# Patient Record
Sex: FEMALE | Race: WHITE | NOT HISPANIC OR LATINO
[De-identification: names, ages, dates, MRNs, and addresses within clinical notes are randomized per-mention and may not be internally consistent; named-entity substitution may affect disease eponyms.]

---

## 2017-08-17 ENCOUNTER — RESULT REVIEW (OUTPATIENT)
Age: 47
End: 2017-08-17

## 2017-12-16 ENCOUNTER — INPATIENT (INPATIENT)
Facility: HOSPITAL | Age: 47
LOS: 3 days | Discharge: HOME | DRG: 482 | End: 2017-12-20
Attending: ORTHOPAEDIC SURGERY | Admitting: ORTHOPAEDIC SURGERY
Payer: MEDICAID

## 2017-12-16 VITALS
DIASTOLIC BLOOD PRESSURE: 62 MMHG | HEART RATE: 76 BPM | SYSTOLIC BLOOD PRESSURE: 99 MMHG | OXYGEN SATURATION: 99 % | RESPIRATION RATE: 16 BRPM | TEMPERATURE: 97 F

## 2017-12-16 PROCEDURE — 99285 EMERGENCY DEPT VISIT HI MDM: CPT | Mod: 25

## 2017-12-17 DIAGNOSIS — R52 PAIN, UNSPECIFIED: ICD-10-CM

## 2017-12-17 DIAGNOSIS — E87.6 HYPOKALEMIA: ICD-10-CM

## 2017-12-17 DIAGNOSIS — S72.002A FRACTURE OF UNSPECIFIED PART OF NECK OF LEFT FEMUR, INITIAL ENCOUNTER FOR CLOSED FRACTURE: ICD-10-CM

## 2017-12-17 LAB
ALBUMIN SERPL ELPH-MCNC: 4.5 G/DL — SIGNIFICANT CHANGE UP (ref 3.3–5)
ALP SERPL-CCNC: 46 U/L — SIGNIFICANT CHANGE UP (ref 40–120)
ALT FLD-CCNC: 25 U/L RC — SIGNIFICANT CHANGE UP (ref 10–45)
ANION GAP SERPL CALC-SCNC: 12 MMOL/L — SIGNIFICANT CHANGE UP (ref 5–17)
APPEARANCE UR: CLEAR — SIGNIFICANT CHANGE UP
APTT BLD: 31.7 SEC — SIGNIFICANT CHANGE UP (ref 27.5–37.4)
AST SERPL-CCNC: 29 U/L — SIGNIFICANT CHANGE UP (ref 10–40)
BASOPHILS # BLD AUTO: 0 K/UL — SIGNIFICANT CHANGE UP (ref 0–0.2)
BASOPHILS NFR BLD AUTO: 0.3 % — SIGNIFICANT CHANGE UP (ref 0–2)
BILIRUB SERPL-MCNC: 0.4 MG/DL — SIGNIFICANT CHANGE UP (ref 0.2–1.2)
BILIRUB UR-MCNC: NEGATIVE — SIGNIFICANT CHANGE UP
BLD GP AB SCN SERPL QL: NEGATIVE — SIGNIFICANT CHANGE UP
BUN SERPL-MCNC: 13 MG/DL — SIGNIFICANT CHANGE UP (ref 7–23)
CALCIUM SERPL-MCNC: 9.1 MG/DL — SIGNIFICANT CHANGE UP (ref 8.4–10.5)
CHLORIDE SERPL-SCNC: 105 MMOL/L — SIGNIFICANT CHANGE UP (ref 96–108)
CO2 SERPL-SCNC: 23 MMOL/L — SIGNIFICANT CHANGE UP (ref 22–31)
COLOR SPEC: SIGNIFICANT CHANGE UP
CREAT SERPL-MCNC: 0.57 MG/DL — SIGNIFICANT CHANGE UP (ref 0.5–1.3)
DIFF PNL FLD: ABNORMAL
EOSINOPHIL # BLD AUTO: 0 K/UL — SIGNIFICANT CHANGE UP (ref 0–0.5)
EOSINOPHIL NFR BLD AUTO: 0.2 % — SIGNIFICANT CHANGE UP (ref 0–6)
GLUCOSE SERPL-MCNC: 132 MG/DL — HIGH (ref 70–99)
GLUCOSE UR QL: NEGATIVE — SIGNIFICANT CHANGE UP
HCG SERPL-ACNC: <2 MIU/ML — LOW (ref 5–24)
HCT VFR BLD CALC: 37.9 % — SIGNIFICANT CHANGE UP (ref 34.5–45)
HGB BLD-MCNC: 12.9 G/DL — SIGNIFICANT CHANGE UP (ref 11.5–15.5)
INR BLD: 1.09 RATIO — SIGNIFICANT CHANGE UP (ref 0.88–1.16)
KETONES UR-MCNC: NEGATIVE — SIGNIFICANT CHANGE UP
LEUKOCYTE ESTERASE UR-ACNC: NEGATIVE — SIGNIFICANT CHANGE UP
LYMPHOCYTES # BLD AUTO: 1 K/UL — SIGNIFICANT CHANGE UP (ref 1–3.3)
LYMPHOCYTES # BLD AUTO: 11.3 % — LOW (ref 13–44)
MCHC RBC-ENTMCNC: 33.5 PG — SIGNIFICANT CHANGE UP (ref 27–34)
MCHC RBC-ENTMCNC: 34.2 GM/DL — SIGNIFICANT CHANGE UP (ref 32–36)
MCV RBC AUTO: 97.9 FL — SIGNIFICANT CHANGE UP (ref 80–100)
MONOCYTES # BLD AUTO: 0.5 K/UL — SIGNIFICANT CHANGE UP (ref 0–0.9)
MONOCYTES NFR BLD AUTO: 5.5 % — SIGNIFICANT CHANGE UP (ref 2–14)
NEUTROPHILS # BLD AUTO: 7.3 K/UL — SIGNIFICANT CHANGE UP (ref 1.8–7.4)
NEUTROPHILS NFR BLD AUTO: 82.7 % — HIGH (ref 43–77)
NITRITE UR-MCNC: NEGATIVE — SIGNIFICANT CHANGE UP
PH UR: 7 — SIGNIFICANT CHANGE UP (ref 5–8)
PLATELET # BLD AUTO: 191 K/UL — SIGNIFICANT CHANGE UP (ref 150–400)
POTASSIUM SERPL-MCNC: 3.4 MMOL/L — LOW (ref 3.5–5.3)
POTASSIUM SERPL-SCNC: 3.4 MMOL/L — LOW (ref 3.5–5.3)
PROT SERPL-MCNC: 7.2 G/DL — SIGNIFICANT CHANGE UP (ref 6–8.3)
PROT UR-MCNC: NEGATIVE — SIGNIFICANT CHANGE UP
PROTHROM AB SERPL-ACNC: 11.9 SEC — SIGNIFICANT CHANGE UP (ref 9.8–12.7)
RBC # BLD: 3.87 M/UL — SIGNIFICANT CHANGE UP (ref 3.8–5.2)
RBC # FLD: 11.3 % — SIGNIFICANT CHANGE UP (ref 10.3–14.5)
RH IG SCN BLD-IMP: POSITIVE — SIGNIFICANT CHANGE UP
RH IG SCN BLD-IMP: POSITIVE — SIGNIFICANT CHANGE UP
SODIUM SERPL-SCNC: 140 MMOL/L — SIGNIFICANT CHANGE UP (ref 135–145)
SP GR SPEC: 1.01 — SIGNIFICANT CHANGE UP (ref 1.01–1.02)
UROBILINOGEN FLD QL: NEGATIVE — SIGNIFICANT CHANGE UP
WBC # BLD: 8.8 K/UL — SIGNIFICANT CHANGE UP (ref 3.8–10.5)
WBC # FLD AUTO: 8.8 K/UL — SIGNIFICANT CHANGE UP (ref 3.8–10.5)

## 2017-12-17 PROCEDURE — 73502 X-RAY EXAM HIP UNI 2-3 VIEWS: CPT | Mod: 26,LT

## 2017-12-17 PROCEDURE — 72192 CT PELVIS W/O DYE: CPT | Mod: 26

## 2017-12-17 PROCEDURE — 73502 X-RAY EXAM HIP UNI 2-3 VIEWS: CPT | Mod: 26,77,LT

## 2017-12-17 PROCEDURE — 73552 X-RAY EXAM OF FEMUR 2/>: CPT | Mod: 26,LT

## 2017-12-17 PROCEDURE — 71010: CPT | Mod: 26

## 2017-12-17 PROCEDURE — 76377 3D RENDER W/INTRP POSTPROCES: CPT | Mod: 26

## 2017-12-17 RX ORDER — ASCORBIC ACID 60 MG
500 TABLET,CHEWABLE ORAL
Qty: 0 | Refills: 0 | Status: DISCONTINUED | OUTPATIENT
Start: 2017-12-17 | End: 2017-12-20

## 2017-12-17 RX ORDER — SODIUM CHLORIDE 9 MG/ML
1000 INJECTION INTRAMUSCULAR; INTRAVENOUS; SUBCUTANEOUS
Qty: 0 | Refills: 0 | Status: DISCONTINUED | OUTPATIENT
Start: 2017-12-17 | End: 2017-12-17

## 2017-12-17 RX ORDER — ACETAMINOPHEN 500 MG
1000 TABLET ORAL ONCE
Qty: 0 | Refills: 0 | Status: COMPLETED | OUTPATIENT
Start: 2017-12-17 | End: 2017-12-17

## 2017-12-17 RX ORDER — ACETAMINOPHEN 500 MG
325 TABLET ORAL EVERY 4 HOURS
Qty: 0 | Refills: 0 | Status: DISCONTINUED | OUTPATIENT
Start: 2017-12-17 | End: 2017-12-17

## 2017-12-17 RX ORDER — OXYCODONE HYDROCHLORIDE 5 MG/1
5 TABLET ORAL EVERY 4 HOURS
Qty: 0 | Refills: 0 | Status: DISCONTINUED | OUTPATIENT
Start: 2017-12-17 | End: 2017-12-20

## 2017-12-17 RX ORDER — ACETAMINOPHEN 500 MG
975 TABLET ORAL ONCE
Qty: 0 | Refills: 0 | Status: DISCONTINUED | OUTPATIENT
Start: 2017-12-17 | End: 2017-12-17

## 2017-12-17 RX ORDER — ONDANSETRON 8 MG/1
4 TABLET, FILM COATED ORAL ONCE
Qty: 0 | Refills: 0 | Status: DISCONTINUED | OUTPATIENT
Start: 2017-12-17 | End: 2017-12-17

## 2017-12-17 RX ORDER — OXYCODONE HYDROCHLORIDE 5 MG/1
5 TABLET ORAL EVERY 4 HOURS
Qty: 0 | Refills: 0 | Status: DISCONTINUED | OUTPATIENT
Start: 2017-12-17 | End: 2017-12-17

## 2017-12-17 RX ORDER — HYDROMORPHONE HYDROCHLORIDE 2 MG/ML
1 INJECTION INTRAMUSCULAR; INTRAVENOUS; SUBCUTANEOUS EVERY 4 HOURS
Qty: 0 | Refills: 0 | Status: DISCONTINUED | OUTPATIENT
Start: 2017-12-17 | End: 2017-12-18

## 2017-12-17 RX ORDER — ALPRAZOLAM 0.25 MG
1 TABLET ORAL ONCE
Qty: 0 | Refills: 0 | Status: DISCONTINUED | OUTPATIENT
Start: 2017-12-17 | End: 2017-12-17

## 2017-12-17 RX ORDER — ONDANSETRON 8 MG/1
4 TABLET, FILM COATED ORAL EVERY 6 HOURS
Qty: 0 | Refills: 0 | Status: DISCONTINUED | OUTPATIENT
Start: 2017-12-17 | End: 2017-12-20

## 2017-12-17 RX ORDER — HYDROMORPHONE HYDROCHLORIDE 2 MG/ML
1 INJECTION INTRAMUSCULAR; INTRAVENOUS; SUBCUTANEOUS EVERY 4 HOURS
Qty: 0 | Refills: 0 | Status: DISCONTINUED | OUTPATIENT
Start: 2017-12-17 | End: 2017-12-17

## 2017-12-17 RX ORDER — SODIUM CHLORIDE 9 MG/ML
1000 INJECTION, SOLUTION INTRAVENOUS ONCE
Qty: 0 | Refills: 0 | Status: COMPLETED | OUTPATIENT
Start: 2017-12-17 | End: 2017-12-17

## 2017-12-17 RX ORDER — MORPHINE SULFATE 50 MG/1
4 CAPSULE, EXTENDED RELEASE ORAL ONCE
Qty: 0 | Refills: 0 | Status: DISCONTINUED | OUTPATIENT
Start: 2017-12-17 | End: 2017-12-17

## 2017-12-17 RX ORDER — KETOROLAC TROMETHAMINE 30 MG/ML
30 SYRINGE (ML) INJECTION ONCE
Qty: 0 | Refills: 0 | Status: DISCONTINUED | OUTPATIENT
Start: 2017-12-17 | End: 2017-12-17

## 2017-12-17 RX ORDER — FERROUS SULFATE 325(65) MG
325 TABLET ORAL
Qty: 0 | Refills: 0 | Status: DISCONTINUED | OUTPATIENT
Start: 2017-12-17 | End: 2017-12-20

## 2017-12-17 RX ORDER — DOCUSATE SODIUM 100 MG
100 CAPSULE ORAL THREE TIMES A DAY
Qty: 0 | Refills: 0 | Status: DISCONTINUED | OUTPATIENT
Start: 2017-12-17 | End: 2017-12-20

## 2017-12-17 RX ORDER — ASPIRIN/CALCIUM CARB/MAGNESIUM 325 MG
325 TABLET ORAL
Qty: 0 | Refills: 0 | Status: DISCONTINUED | OUTPATIENT
Start: 2017-12-18 | End: 2017-12-18

## 2017-12-17 RX ORDER — ACETAMINOPHEN 500 MG
650 TABLET ORAL EVERY 6 HOURS
Qty: 0 | Refills: 0 | Status: DISCONTINUED | OUTPATIENT
Start: 2017-12-17 | End: 2017-12-20

## 2017-12-17 RX ORDER — OXYCODONE HYDROCHLORIDE 5 MG/1
10 TABLET ORAL EVERY 4 HOURS
Qty: 0 | Refills: 0 | Status: DISCONTINUED | OUTPATIENT
Start: 2017-12-17 | End: 2017-12-17

## 2017-12-17 RX ORDER — SODIUM CHLORIDE 9 MG/ML
1000 INJECTION INTRAMUSCULAR; INTRAVENOUS; SUBCUTANEOUS
Qty: 0 | Refills: 0 | Status: DISCONTINUED | OUTPATIENT
Start: 2017-12-17 | End: 2017-12-20

## 2017-12-17 RX ORDER — FOLIC ACID 0.8 MG
1 TABLET ORAL DAILY
Qty: 0 | Refills: 0 | Status: DISCONTINUED | OUTPATIENT
Start: 2017-12-17 | End: 2017-12-20

## 2017-12-17 RX ORDER — HYDROMORPHONE HYDROCHLORIDE 2 MG/ML
0.5 INJECTION INTRAMUSCULAR; INTRAVENOUS; SUBCUTANEOUS
Qty: 0 | Refills: 0 | Status: DISCONTINUED | OUTPATIENT
Start: 2017-12-17 | End: 2017-12-17

## 2017-12-17 RX ORDER — ACETAMINOPHEN 500 MG
650 TABLET ORAL EVERY 6 HOURS
Qty: 0 | Refills: 0 | Status: DISCONTINUED | OUTPATIENT
Start: 2017-12-17 | End: 2017-12-17

## 2017-12-17 RX ORDER — VANCOMYCIN HCL 1 G
1000 VIAL (EA) INTRAVENOUS ONCE
Qty: 0 | Refills: 0 | Status: COMPLETED | OUTPATIENT
Start: 2017-12-18 | End: 2017-12-18

## 2017-12-17 RX ORDER — OXYCODONE HYDROCHLORIDE 5 MG/1
10 TABLET ORAL EVERY 4 HOURS
Qty: 0 | Refills: 0 | Status: DISCONTINUED | OUTPATIENT
Start: 2017-12-17 | End: 2017-12-20

## 2017-12-17 RX ADMIN — Medication 400 MILLIGRAM(S): at 10:20

## 2017-12-17 RX ADMIN — HYDROMORPHONE HYDROCHLORIDE 0.5 MILLIGRAM(S): 2 INJECTION INTRAMUSCULAR; INTRAVENOUS; SUBCUTANEOUS at 20:20

## 2017-12-17 RX ADMIN — SODIUM CHLORIDE 100 MILLILITER(S): 9 INJECTION INTRAMUSCULAR; INTRAVENOUS; SUBCUTANEOUS at 06:32

## 2017-12-17 RX ADMIN — HYDROMORPHONE HYDROCHLORIDE 0.5 MILLIGRAM(S): 2 INJECTION INTRAMUSCULAR; INTRAVENOUS; SUBCUTANEOUS at 20:35

## 2017-12-17 RX ADMIN — SODIUM CHLORIDE 75 MILLILITER(S): 9 INJECTION INTRAMUSCULAR; INTRAVENOUS; SUBCUTANEOUS at 21:52

## 2017-12-17 RX ADMIN — HYDROMORPHONE HYDROCHLORIDE 0.5 MILLIGRAM(S): 2 INJECTION INTRAMUSCULAR; INTRAVENOUS; SUBCUTANEOUS at 20:45

## 2017-12-17 RX ADMIN — SODIUM CHLORIDE 75 MILLILITER(S): 9 INJECTION INTRAMUSCULAR; INTRAVENOUS; SUBCUTANEOUS at 20:45

## 2017-12-17 RX ADMIN — HYDROMORPHONE HYDROCHLORIDE 0.5 MILLIGRAM(S): 2 INJECTION INTRAMUSCULAR; INTRAVENOUS; SUBCUTANEOUS at 21:00

## 2017-12-17 RX ADMIN — Medication 30 MILLIGRAM(S): at 00:31

## 2017-12-17 RX ADMIN — ONDANSETRON 4 MILLIGRAM(S): 8 TABLET, FILM COATED ORAL at 21:52

## 2017-12-17 RX ADMIN — Medication 1000 MILLIGRAM(S): at 02:00

## 2017-12-17 RX ADMIN — Medication 400 MILLIGRAM(S): at 01:51

## 2017-12-17 RX ADMIN — OXYCODONE HYDROCHLORIDE 5 MILLIGRAM(S): 5 TABLET ORAL at 11:13

## 2017-12-17 RX ADMIN — OXYCODONE HYDROCHLORIDE 5 MILLIGRAM(S): 5 TABLET ORAL at 12:21

## 2017-12-17 RX ADMIN — Medication 30 MILLIGRAM(S): at 01:30

## 2017-12-17 RX ADMIN — SODIUM CHLORIDE 500 MILLILITER(S): 9 INJECTION, SOLUTION INTRAVENOUS at 22:30

## 2017-12-17 NOTE — CHART NOTE - NSCHARTNOTEFT_GEN_A_CORE
No acute events post-op. Pain well controlled with pain medications. C/o some dizziness, nausea.      Vital Signs Last 24 Hrs  T(C): 36.3 (17 Dec 2017 21:45), Max: 37.1 (17 Dec 2017 16:34)  T(F): 97.4 (17 Dec 2017 21:45), Max: 98.7 (17 Dec 2017 16:34)  HR: 67 (17 Dec 2017 21:45) (60 - 82)  BP: 96/65 (17 Dec 2017 21:45) (96/65 - 129/77)  BP(mean): 77 (17 Dec 2017 21:15) (77 - 85)  RR: 16 (17 Dec 2017 21:45) (14 - 16)  SpO2: 98% (17 Dec 2017 21:45) (97% - 100%)    Exam:  Gen: NAD  LLE:  Motor: 5/5 EHL/FHL/TA/Gastrocnemius  Sensory: SILT DP/SP/S/S/T nerve distributions  Vascular: 2+ Dorsalis Pedis pulse  Dressing: Clean, Dry, Intact    A/P: 47 year old F s/p CRPP L FN Fx    - Pain Control  - Regular Diet  - PT/OT, OOB, TTWB LLE  - ASA DVT PPx

## 2017-12-17 NOTE — ED PROVIDER NOTE - OBJECTIVE STATEMENT
47F with no past medical history presents with L hip pain after fall onto L hip while walking down ramp.  No head trauma, loc, n/v, headache.  Had pain with walking so bibems.  Able to move leg, LE warm to touch.  Denies prior injury to hip, abdominal pain, chest pain, shortness of breath. 47F with no past medical history presents with L hip pain after fall onto L hip while walking down ramp.  No head trauma, loc, n/v, headache.  Had pain with walking so bibems.  Able to move leg, LE warm to touch.  Denies prior injury to hip, abdominal pain, chest pain, shortness of breath.    Attendinyo female presents with left hip pain s/p fall on a ramp just prior to arrival.  Pt unable to ambulate.

## 2017-12-17 NOTE — ED ADULT NURSE NOTE - OBJECTIVE STATEMENT
Pt. is a 48 yo female c/o L hip pain s/p going down a ramp & landing on her L hip. No loc, no head trauma. A&Ox3.

## 2017-12-17 NOTE — H&P ADULT - ASSESSMENT
A/P: 47y Female with L valgus impacted femoral neck fracture and R inf pubic ramus fracture  Pain control  NWB LLE, bedrest  FU labs/imaging  Medical clearance/optimization for OR  NPO  IVF  OR today for CRPP

## 2017-12-17 NOTE — BRIEF OPERATIVE NOTE - POST-OP DX
Femoral neck fracture  12/17/2017  left valgus-impacted femoral neck fracture  Active  Samir Valentine

## 2017-12-17 NOTE — CONSULT NOTE ADULT - SUBJECTIVE AND OBJECTIVE BOX
Patient is a 48 yo womanwho states she slipped and felll last night in her garage. Patient was brought to [facility] by ambulance. In ED pt was found to have a left hip fx.Pt is scheduled for an ORIF of the left hip  Patient is a 47y old  Female who presents with a chief complaint of mechanical fall (17 Dec 2017 06:49)      PAST MEDICAL & SURGICAL HISTORY:  C section x 2      MEDICATIONS  (STANDING):  sodium chloride 0.9%. 1000 milliLiter(s) (100 mL/Hr) IV Continuous <Continuous>    MEDICATIONS  (PRN):  acetaminophen   Tablet 650 milliGRAM(s) Oral every 6 hours PRN For Temp greater than 38 C (100.4 F)  acetaminophen   Tablet. 325 milliGRAM(s) Oral every 4 hours PRN Mild Pain (1 - 3)  HYDROmorphone  Injectable 1 milliGRAM(s) IV Push every 4 hours PRN breakthrough pain  oxyCODONE    IR 10 milliGRAM(s) Oral every 4 hours PRN Severe Pain (7 - 10)  oxyCODONE    IR 5 milliGRAM(s) Oral every 4 hours PRN Moderate Pain (4 - 6)    SOC HX:  Tobacco: Neg  ETOH: Neg  Drugs: Neg    Family Hx:  as per my conversation with the patient, noncontributory    allergies: PCN    CONSTITUTIONAL: No weakness, fevers or chills  EYES/ENT: No visual changes;  No vertigo or throat pain   NECK: No pain or stiffness  RESPIRATORY: No cough, wheezing, hemoptysis; No shortness of breath  CARDIOVASCULAR: No chest pain or palpitations  GASTROINTESTINAL: No abdominal or epigastric pain. No nausea, vomiting, or hematemesis; No diarrhea or constipation. No melena or hematochezia.  GENITOURINARY: No dysuria, frequency or hematuria  NEUROLOGICAL: No numbness or weakness  SKIN: No itching, burning, rashes, or lesions   MUSCULOSKELETAL: Left leg pain    INTERVAL HPI/OVERNIGHT EVENTS:  T(C): 36.9 (12-17-17 @ 06:26), Max: 36.9 (12-17-17 @ 06:26)  HR: 70 (12-17-17 @ 06:26) (70 - 78)  BP: 113/72 (12-17-17 @ 06:26) (99/62 - 116/69)  RR: 16 (12-17-17 @ 06:26) (16 - 16)  SpO2: 100% (12-17-17 @ 06:26) (97% - 100%)  Wt(kg): --  I&O's Summary      PHYSICAL EXAM:  GENERAL: NAD, well-groomed, well-developed  HEAD:  Atraumatic, Normocephalic  EYES: EOMI, PERRLA, conjunctiva and sclera clear  ENMT: No tonsillar erythema, exudates, or enlargement; Moist mucous membranes, Good dentition, No lesions  NECK: Supple, No JVD, Normal thyroid  NERVOUS SYSTEM:  Alert & Oriented X3, Good concentration; Motor Strength 5/5 B/L upper and lower extremities; DTRs 2+ intact and symmetric  CHEST/LUNG: Clear to percussion bilaterally; No rales, rhonchi, wheezing, or rubs  HEART: Regular rate and rhythm; No murmurs, rubs, or gallops  ABDOMEN: Soft, Nontender, Nondistended; Bowel sounds present  EXTREMITIES: shortening and external rotation of left leg  LYMPH: No lymphadenopathy noted  SKIN: No rashes or lesions        LABS:                        12.9   8.8   )-----------( 191      ( 17 Dec 2017 01:50 )             37.9     12-17    140  |  105  |  13  ----------------------------<  132<H>  3.4<L>   |  23  |  0.57    Ca    9.1      17 Dec 2017 01:50    TPro  7.2  /  Alb  4.5  /  TBili  0.4  /  DBili  x   /  AST  29  /  ALT  25  /  AlkPhos  46  12-17    PT/INR - ( 17 Dec 2017 01:50 )   PT: 11.9 sec;   INR: 1.09 ratio         PTT - ( 17 Dec 2017 01:50 )  PTT:31.7 sec    EKG:  NSR @ 77 BPM

## 2017-12-17 NOTE — H&P ADULT - NSHPPHYSICALEXAM_GEN_ALL_CORE
Physical Exam  Gen: NAD  LLE: skin intact, short/ER leg, unable to SLR, + log roll, +ttp hip/groin, no ttp elsewhere, +ehl/fhl/ta/gs function, no calf ttp, dp/pt pulse intact, compartments soft  Secondary survey: benign, nv intact, able to SLR contralateral leg but with pain, negative log roll contralateral leg, bony ttp right pubic symphsis and groin, no bony ttp elsewhere, bilateral upper extremity skin intact with no gross deformity, non tender to palpation over bony prominences, neurovascularly intact

## 2017-12-18 LAB
ANION GAP SERPL CALC-SCNC: 12 MMOL/L — SIGNIFICANT CHANGE UP (ref 5–17)
BUN SERPL-MCNC: 9 MG/DL — SIGNIFICANT CHANGE UP (ref 7–23)
CALCIUM SERPL-MCNC: 7.8 MG/DL — LOW (ref 8.4–10.5)
CHLORIDE SERPL-SCNC: 107 MMOL/L — SIGNIFICANT CHANGE UP (ref 96–108)
CO2 SERPL-SCNC: 20 MMOL/L — LOW (ref 22–31)
CREAT SERPL-MCNC: 0.57 MG/DL — SIGNIFICANT CHANGE UP (ref 0.5–1.3)
CULTURE RESULTS: NO GROWTH — SIGNIFICANT CHANGE UP
GLUCOSE SERPL-MCNC: 145 MG/DL — HIGH (ref 70–99)
HCT VFR BLD CALC: 33.9 % — LOW (ref 34.5–45)
HGB BLD-MCNC: 10.9 G/DL — LOW (ref 11.5–15.5)
MCHC RBC-ENTMCNC: 30.7 PG — SIGNIFICANT CHANGE UP (ref 27–34)
MCHC RBC-ENTMCNC: 32.2 GM/DL — SIGNIFICANT CHANGE UP (ref 32–36)
MCV RBC AUTO: 95.5 FL — SIGNIFICANT CHANGE UP (ref 80–100)
PLATELET # BLD AUTO: 150 K/UL — SIGNIFICANT CHANGE UP (ref 150–400)
POTASSIUM SERPL-MCNC: 3.9 MMOL/L — SIGNIFICANT CHANGE UP (ref 3.5–5.3)
POTASSIUM SERPL-SCNC: 3.9 MMOL/L — SIGNIFICANT CHANGE UP (ref 3.5–5.3)
RBC # BLD: 3.55 M/UL — LOW (ref 3.8–5.2)
RBC # FLD: 12.5 % — SIGNIFICANT CHANGE UP (ref 10.3–14.5)
SODIUM SERPL-SCNC: 139 MMOL/L — SIGNIFICANT CHANGE UP (ref 135–145)
SPECIMEN SOURCE: SIGNIFICANT CHANGE UP
WBC # BLD: 5.79 K/UL — SIGNIFICANT CHANGE UP (ref 3.8–10.5)
WBC # FLD AUTO: 5.79 K/UL — SIGNIFICANT CHANGE UP (ref 3.8–10.5)

## 2017-12-18 RX ORDER — HYDROMORPHONE HYDROCHLORIDE 2 MG/ML
1 INJECTION INTRAMUSCULAR; INTRAVENOUS; SUBCUTANEOUS
Qty: 0 | Refills: 0 | Status: DISCONTINUED | OUTPATIENT
Start: 2017-12-18 | End: 2017-12-20

## 2017-12-18 RX ORDER — ASPIRIN/CALCIUM CARB/MAGNESIUM 324 MG
325 TABLET ORAL EVERY 12 HOURS
Qty: 0 | Refills: 0 | Status: DISCONTINUED | OUTPATIENT
Start: 2017-12-18 | End: 2017-12-18

## 2017-12-18 RX ORDER — ASPIRIN/CALCIUM CARB/MAGNESIUM 324 MG
325 TABLET ORAL
Qty: 0 | Refills: 0 | Status: DISCONTINUED | OUTPATIENT
Start: 2017-12-18 | End: 2017-12-20

## 2017-12-18 RX ORDER — ASPIRIN/CALCIUM CARB/MAGNESIUM 324 MG
325 TABLET ORAL DAILY
Qty: 0 | Refills: 0 | Status: DISCONTINUED | OUTPATIENT
Start: 2017-12-18 | End: 2017-12-18

## 2017-12-18 RX ORDER — ACETAMINOPHEN 500 MG
1000 TABLET ORAL ONCE
Qty: 0 | Refills: 0 | Status: COMPLETED | OUTPATIENT
Start: 2017-12-18 | End: 2017-12-18

## 2017-12-18 RX ADMIN — Medication 400 MILLIGRAM(S): at 10:06

## 2017-12-18 RX ADMIN — OXYCODONE HYDROCHLORIDE 5 MILLIGRAM(S): 5 TABLET ORAL at 23:50

## 2017-12-18 RX ADMIN — Medication 100 MILLIGRAM(S): at 12:43

## 2017-12-18 RX ADMIN — Medication 325 MILLIGRAM(S): at 17:28

## 2017-12-18 RX ADMIN — Medication 325 MILLIGRAM(S): at 07:51

## 2017-12-18 RX ADMIN — OXYCODONE HYDROCHLORIDE 5 MILLIGRAM(S): 5 TABLET ORAL at 04:10

## 2017-12-18 RX ADMIN — Medication 1 MILLIGRAM(S): at 12:43

## 2017-12-18 RX ADMIN — OXYCODONE HYDROCHLORIDE 5 MILLIGRAM(S): 5 TABLET ORAL at 04:40

## 2017-12-18 RX ADMIN — OXYCODONE HYDROCHLORIDE 5 MILLIGRAM(S): 5 TABLET ORAL at 00:34

## 2017-12-18 RX ADMIN — Medication 1 TABLET(S): at 12:43

## 2017-12-18 RX ADMIN — Medication 325 MILLIGRAM(S): at 12:43

## 2017-12-18 RX ADMIN — Medication 500 MILLIGRAM(S): at 17:28

## 2017-12-18 RX ADMIN — Medication 100 MILLIGRAM(S): at 05:49

## 2017-12-18 RX ADMIN — OXYCODONE HYDROCHLORIDE 5 MILLIGRAM(S): 5 TABLET ORAL at 00:04

## 2017-12-18 RX ADMIN — Medication 500 MILLIGRAM(S): at 05:49

## 2017-12-18 RX ADMIN — Medication 325 MILLIGRAM(S): at 06:54

## 2017-12-18 RX ADMIN — OXYCODONE HYDROCHLORIDE 5 MILLIGRAM(S): 5 TABLET ORAL at 14:20

## 2017-12-18 RX ADMIN — Medication 250 MILLIGRAM(S): at 05:49

## 2017-12-18 NOTE — PROGRESS NOTE ADULT - ASSESSMENT
47F with right pubic ramus fracture, s/p left hip CRPP POD# 1    TTWB left LE  WBAT right LE  No active heel lifts  No hip ROM exercises  Pain control  DVT prophylaxis  PT  Discharge planning

## 2017-12-18 NOTE — PROGRESS NOTE ADULT - SUBJECTIVE AND OBJECTIVE BOX
Patient is a 46 yo woman who states she slipped and fell last night in her garage. Patient was brought to [facility] by ambulance. In ED pt was found to have a left hip fx. Pt is scheduled for an ORIF of the left hip    MEDICATIONS  (STANDING):  ascorbic acid 500 milliGRAM(s) Oral two times a day  aspirin enteric coated 325 milliGRAM(s) Oral two times a day  docusate sodium 100 milliGRAM(s) Oral three times a day  ferrous    sulfate 325 milliGRAM(s) Oral three times a day with meals  folic acid 1 milliGRAM(s) Oral daily  multivitamin 1 Tablet(s) Oral daily  sodium chloride 0.9%. 1000 milliLiter(s) (75 mL/Hr) IV Continuous <Continuous>    MEDICATIONS  (PRN):  acetaminophen   Tablet 650 milliGRAM(s) Oral every 6 hours PRN For Temp greater than 38 C (100.4 F)  HYDROmorphone  Injectable 1 milliGRAM(s) IV Push every 3 hours PRN Severe Pain (7 - 10)  ondansetron Injectable 4 milliGRAM(s) IV Push every 6 hours PRN Nausea and/or Vomiting  oxyCODONE    IR 10 milliGRAM(s) Oral every 4 hours PRN Moderate Pain  oxyCODONE    IR 5 milliGRAM(s) Oral every 4 hours PRN Mild Pain      CONSTITUTIONAL: No weakness, fevers or chills  EYES/ENT: No visual changes;  No vertigo or throat pain   NECK: No pain or stiffness  RESPIRATORY: No cough, wheezing, hemoptysis; No shortness of breath  CARDIOVASCULAR: No chest pain or palpitations  GASTROINTESTINAL: No abdominal or epigastric pain. No nausea, vomiting, or hematemesis; No diarrhea or constipation. No melena or hematochezia.  GENITOURINARY: No dysuria, frequency or hematuria  NEUROLOGICAL: No numbness or weakness  SKIN: No itching, burning, rashes, or lesions   MUSCULOSKELETAL: Left leg pain    VITALS:   T(C): 36.7 (17 @ 09:30), Max: 37.1 (17 @ 16:34)  HR: 91 (17 @ 09:30) (60 - 91)  BP: 147/88 (17 @ 09:30) (93/59 - 147/88)  RR: 18 (17 @ 09:30) (14 - 18)  SpO2: 99% (17 @ 09:30) (95% - 100%)  Wt(kg): --        LABS:      PHYSICAL EXAM:  GENERAL: NAD, well-groomed, well-developed  HEAD:  Atraumatic, Normocephalic  EYES: EOMI, PERRLA, conjunctiva and sclera clear  ENMT: No tonsillar erythema, exudates, or enlargement; Moist mucous membranes, Good dentition, No lesions  NECK: Supple, No JVD, Normal thyroid  NERVOUS SYSTEM:  Alert & Oriented X3, Good concentration; Motor Strength 5/5 B/L upper and lower extremities; DTRs 2+ intact and symmetric  CHEST/LUNG: Clear to percussion bilaterally; No rales, rhonchi, wheezing, or rubs  HEART: Regular rate and rhythm; No murmurs, rubs, or gallops  ABDOMEN: Soft, Nontender, Nondistended; Bowel sounds present  EXTREMITIES: shortening and external rotation of left leg  LYMPH: No lymphadenopathy noted  SKIN: No rashes or lesions        CBC Full  -  ( 18 Dec 2017 08:39 )  WBC Count : 5.79 K/uL  Hemoglobin : 10.9 g/dL  Hematocrit : 33.9 %  Platelet Count - Automated : 150 K/uL  Mean Cell Volume : 95.5 fl  Mean Cell Hemoglobin : 30.7 pg  Mean Cell Hemoglobin Concentration : 32.2 gm/dL  Auto Neutrophil # : x  Auto Lymphocyte # : x  Auto Monocyte # : x  Auto Eosinophil # : x  Auto Basophil # : x  Auto Neutrophil % : x  Auto Lymphocyte % : x  Auto Monocyte % : x  Auto Eosinophil % : x  Auto Basophil % : x        140  |  105  |  13  ----------------------------<  132<H>  3.4<L>   |  23  |  0.57    Ca    9.1      17 Dec 2017 01:50    TPro  7.2  /  Alb  4.5  /  TBili  0.4  /  DBili  x   /  AST  29  /  ALT  25  /  AlkPhos  46  12-    LIVER FUNCTIONS - ( 17 Dec 2017 01:50 )  Alb: 4.5 g/dL / Pro: 7.2 g/dL / ALK PHOS: 46 U/L / ALT: 25 U/L RC / AST: 29 U/L / GGT: x           PT/INR - ( 17 Dec 2017 01:50 )   PT: 11.9 sec;   INR: 1.09 ratio         PTT - ( 17 Dec 2017 01:50 )  PTT:31.7 sec  Urinalysis Basic - ( 17 Dec 2017 09:31 )    Color: PL Yellow / Appearance: Clear / S.010 / pH: x  Gluc: x / Ketone: Negative  / Bili: Negative / Urobili: Negative   Blood: x / Protein: Negative / Nitrite: Negative   Leuk Esterase: Negative / RBC: 0-2 /HPF / WBC x   Sq Epi: x / Non Sq Epi: Occasional /HPF / Bacteria: Few /HPF      CAPILLARY BLOOD GLUCOSE          RADIOLOGY & ADDITIONAL TESTS:

## 2017-12-18 NOTE — PHYSICAL THERAPY INITIAL EVALUATION ADULT - PERTINENT HX OF CURRENT PROBLEM, REHAB EVAL
Pt is a 47y Female community ambulator w/o assistive devices presents c/o L hip pain and inability to ambulate s/p mechanical fall 47y Female community ambulator w/o assistive devices presents c/o L hip pain & inability to ambulate sp mechanical fall. CT: Comminuted, impacted left subcapital femoral fracture. s/p Surgical treatment of fracture of hip.

## 2017-12-18 NOTE — OCCUPATIONAL THERAPY INITIAL EVALUATION ADULT - PERTINENT HX OF CURRENT PROBLEM, REHAB EVAL
47y Female community ambulator w/o assistive devices presents c/o L hip pain & inability to ambulate sp mechanical fall. Denies HS/LOC. CT: Comminuted, impacted left subcapital femoral fracture. s/p Surgical treatment of fracture of hip  12/17/2017  closed reduction and percutaneous pinning of left valgus-impacted femoral neck fracture

## 2017-12-18 NOTE — OCCUPATIONAL THERAPY INITIAL EVALUATION ADULT - ANTICIPATED DISCHARGE DISPOSITION, OT EVAL
home w/ OT/Home with home OT for home safety evaluation, functional mobility, balance and ADLs. Home with supervision/assist for all functional activities and ADLs.

## 2017-12-19 ENCOUNTER — TRANSCRIPTION ENCOUNTER (OUTPATIENT)
Age: 47
End: 2017-12-19

## 2017-12-19 LAB
ANION GAP SERPL CALC-SCNC: 9 MMOL/L — SIGNIFICANT CHANGE UP (ref 5–17)
BUN SERPL-MCNC: 11 MG/DL — SIGNIFICANT CHANGE UP (ref 7–23)
CALCIUM SERPL-MCNC: 8.1 MG/DL — LOW (ref 8.4–10.5)
CHLORIDE SERPL-SCNC: 110 MMOL/L — HIGH (ref 96–108)
CO2 SERPL-SCNC: 23 MMOL/L — SIGNIFICANT CHANGE UP (ref 22–31)
CREAT SERPL-MCNC: 0.47 MG/DL — LOW (ref 0.5–1.3)
GLUCOSE SERPL-MCNC: 101 MG/DL — HIGH (ref 70–99)
HCT VFR BLD CALC: 33 % — LOW (ref 34.5–45)
HGB BLD-MCNC: 10.2 G/DL — LOW (ref 11.5–15.5)
MCHC RBC-ENTMCNC: 30.6 PG — SIGNIFICANT CHANGE UP (ref 27–34)
MCHC RBC-ENTMCNC: 30.9 GM/DL — LOW (ref 32–36)
MCV RBC AUTO: 99.1 FL — SIGNIFICANT CHANGE UP (ref 80–100)
PLATELET # BLD AUTO: 147 K/UL — LOW (ref 150–400)
POTASSIUM SERPL-MCNC: 3.5 MMOL/L — SIGNIFICANT CHANGE UP (ref 3.5–5.3)
POTASSIUM SERPL-SCNC: 3.5 MMOL/L — SIGNIFICANT CHANGE UP (ref 3.5–5.3)
RBC # BLD: 3.33 M/UL — LOW (ref 3.8–5.2)
RBC # FLD: 13.2 % — SIGNIFICANT CHANGE UP (ref 10.3–14.5)
SODIUM SERPL-SCNC: 142 MMOL/L — SIGNIFICANT CHANGE UP (ref 135–145)
WBC # BLD: 4.44 K/UL — SIGNIFICANT CHANGE UP (ref 3.8–10.5)
WBC # FLD AUTO: 4.44 K/UL — SIGNIFICANT CHANGE UP (ref 3.8–10.5)

## 2017-12-19 RX ORDER — DOCUSATE SODIUM 100 MG
1 CAPSULE ORAL
Qty: 0 | Refills: 0 | COMMUNITY
Start: 2017-12-19

## 2017-12-19 RX ORDER — ASPIRIN/CALCIUM CARB/MAGNESIUM 324 MG
1 TABLET ORAL
Qty: 0 | Refills: 0 | COMMUNITY
Start: 2017-12-19

## 2017-12-19 RX ORDER — ACETAMINOPHEN 500 MG
650 TABLET ORAL EVERY 6 HOURS
Qty: 0 | Refills: 0 | Status: DISCONTINUED | OUTPATIENT
Start: 2017-12-19 | End: 2017-12-20

## 2017-12-19 RX ORDER — SENNA PLUS 8.6 MG/1
2 TABLET ORAL AT BEDTIME
Qty: 0 | Refills: 0 | Status: DISCONTINUED | OUTPATIENT
Start: 2017-12-19 | End: 2017-12-20

## 2017-12-19 RX ORDER — ACETAMINOPHEN 500 MG
2 TABLET ORAL
Qty: 0 | Refills: 0 | COMMUNITY
Start: 2017-12-19

## 2017-12-19 RX ORDER — SENNA PLUS 8.6 MG/1
2 TABLET ORAL
Qty: 0 | Refills: 0 | COMMUNITY
Start: 2017-12-19

## 2017-12-19 RX ADMIN — Medication 325 MILLIGRAM(S): at 17:35

## 2017-12-19 RX ADMIN — Medication 500 MILLIGRAM(S): at 17:35

## 2017-12-19 RX ADMIN — Medication 325 MILLIGRAM(S): at 05:50

## 2017-12-19 RX ADMIN — Medication 1 MILLIGRAM(S): at 12:44

## 2017-12-19 RX ADMIN — Medication 325 MILLIGRAM(S): at 09:40

## 2017-12-19 RX ADMIN — Medication 325 MILLIGRAM(S): at 12:44

## 2017-12-19 RX ADMIN — Medication 100 MILLIGRAM(S): at 12:45

## 2017-12-19 RX ADMIN — Medication 650 MILLIGRAM(S): at 09:40

## 2017-12-19 RX ADMIN — OXYCODONE HYDROCHLORIDE 5 MILLIGRAM(S): 5 TABLET ORAL at 00:20

## 2017-12-19 RX ADMIN — Medication 500 MILLIGRAM(S): at 05:51

## 2017-12-19 RX ADMIN — Medication 100 MILLIGRAM(S): at 05:50

## 2017-12-19 RX ADMIN — Medication 100 MILLIGRAM(S): at 22:00

## 2017-12-19 RX ADMIN — Medication 1 TABLET(S): at 12:44

## 2017-12-19 NOTE — DISCHARGE NOTE ADULT - PATIENT PORTAL LINK FT
“You can access the [facility] Patient Portal, offered by [facility], by registering with the following website: [email or web address]”

## 2017-12-19 NOTE — PROGRESS NOTE ADULT - SUBJECTIVE AND OBJECTIVE BOX
Patient is a 46 yo woman who states she slipped and fell last night in her garage. Patient was brought to [facility] by ambulance. In ED pt was found to have a left hip fx. Pt is scheduled for an ORIF of the left hip    MEDICATIONS  (STANDING):  ascorbic acid 500 milliGRAM(s) Oral two times a day  aspirin enteric coated 325 milliGRAM(s) Oral two times a day  docusate sodium 100 milliGRAM(s) Oral three times a day  ferrous    sulfate 325 milliGRAM(s) Oral three times a day with meals  folic acid 1 milliGRAM(s) Oral daily  multivitamin 1 Tablet(s) Oral daily  senna 2 Tablet(s) Oral at bedtime  sodium chloride 0.9%. 1000 milliLiter(s) (75 mL/Hr) IV Continuous <Continuous>    MEDICATIONS  (PRN):  acetaminophen   Tablet 650 milliGRAM(s) Oral every 6 hours PRN For Temp greater than 38 C (100.4 F)  acetaminophen   Tablet. 650 milliGRAM(s) Oral every 6 hours PRN Moderate Pain (4 - 6)  HYDROmorphone  Injectable 1 milliGRAM(s) IV Push every 3 hours PRN Severe Pain (7 - 10)  ondansetron Injectable 4 milliGRAM(s) IV Push every 6 hours PRN Nausea and/or Vomiting  oxyCODONE    IR 10 milliGRAM(s) Oral every 4 hours PRN Moderate Pain  oxyCODONE    IR 5 milliGRAM(s) Oral every 4 hours PRN Mild Pain      CONSTITUTIONAL: No weakness, fevers or chills  EYES/ENT: No visual changes;  No vertigo or throat pain   NECK: No pain or stiffness  RESPIRATORY: No cough, wheezing, hemoptysis; No shortness of breath  CARDIOVASCULAR: No chest pain or palpitations  GASTROINTESTINAL: No abdominal or epigastric pain. No nausea, vomiting, or hematemesis; No diarrhea or constipation. No melena or hematochezia.  GENITOURINARY: No dysuria, frequency or hematuria  NEUROLOGICAL: No numbness or weakness  SKIN: No itching, burning, rashes, or lesions   MUSCULOSKELETAL: Left leg pain    Vital Signs Last 24 Hrs  T(C): 36.7 (19 Dec 2017 07:35), Max: 36.9 (18 Dec 2017 14:13)  T(F): 98.1 (19 Dec 2017 07:35), Max: 98.4 (18 Dec 2017 14:13)  HR: 72 (19 Dec 2017 12:41) (70 - 80)  BP: 109/68 (19 Dec 2017 12:41) (100/64 - 109/68)  BP(mean): --  RR: 18 (19 Dec 2017 07:35) (18 - 18)  SpO2: 99% (19 Dec 2017 12:41) (97% - 100%)  LABS:      PHYSICAL EXAM:  GENERAL: NAD, well-groomed, well-developed  HEAD:  Atraumatic, Normocephalic  EYES: EOMI, PERRLA, conjunctiva and sclera clear  ENMT: No tonsillar erythema, exudates, or enlargement; Moist mucous membranes, Good dentition, No lesions  NECK: Supple, No JVD, Normal thyroid  NERVOUS SYSTEM:  Alert & Oriented X3, Good concentration; Motor Strength 5/5 B/L upper and lower extremities; DTRs 2+ intact and symmetric  CHEST/LUNG: Clear to percussion bilaterally; No rales, rhonchi, wheezing, or rubs  HEART: Regular rate and rhythm; No murmurs, rubs, or gallops  ABDOMEN: Soft, Nontender, Nondistended; Bowel sounds present  EXTREMITIES: shortening and external rotation of left leg  LYMPH: No lymphadenopathy noted  SKIN: No rashes or lesions          CBC Full  -  ( 19 Dec 2017 08:57 )  WBC Count : 4.44 K/uL  Hemoglobin : 10.2 g/dL  Hematocrit : 33.0 %  Platelet Count - Automated : 147 K/uL  Mean Cell Volume : 99.1 fl  Mean Cell Hemoglobin : 30.6 pg  Mean Cell Hemoglobin Concentration : 30.9 gm/dL  Auto Neutrophil # : x  Auto Lymphocyte # : x  Auto Monocyte # : x  Auto Eosinophil # : x  Auto Basophil # : x  Auto Neutrophil % : x  Auto Lymphocyte % : x  Auto Monocyte % : x  Auto Eosinophil % : x  Auto Basophil % : x    12-19    142  |  110<H>  |  11  ----------------------------<  101<H>  3.5   |  23  |  0.47<L>    Ca    8.1<L>      19 Dec 2017 08:51                    CAPILLARY BLOOD GLUCOSE          RADIOLOGY & ADDITIONAL TESTS: Patient is a 48 yo woman who states she slipped and fell last night in her garage. Patient was brought to [facility] by ambulance. In ED pt was found to have a left hip fx. Pt is S/P  ORIF of the left hip and complaining of pain and difficulty walking. The patient will be going home when more ambulatory                                                          	    MEDICATIONS  (STANDING):  ascorbic acid 500 milliGRAM(s) Oral two times a day  aspirin enteric coated 325 milliGRAM(s) Oral two times a day  docusate sodium 100 milliGRAM(s) Oral three times a day  ferrous    sulfate 325 milliGRAM(s) Oral three times a day with meals  folic acid 1 milliGRAM(s) Oral daily  multivitamin 1 Tablet(s) Oral daily  senna 2 Tablet(s) Oral at bedtime  sodium chloride 0.9%. 1000 milliLiter(s) (75 mL/Hr) IV Continuous <Continuous>    MEDICATIONS  (PRN):  acetaminophen   Tablet 650 milliGRAM(s) Oral every 6 hours PRN For Temp greater than 38 C (100.4 F)  acetaminophen   Tablet. 650 milliGRAM(s) Oral every 6 hours PRN Moderate Pain (4 - 6)  HYDROmorphone  Injectable 1 milliGRAM(s) IV Push every 3 hours PRN Severe Pain (7 - 10)  ondansetron Injectable 4 milliGRAM(s) IV Push every 6 hours PRN Nausea and/or Vomiting  oxyCODONE    IR 10 milliGRAM(s) Oral every 4 hours PRN Moderate Pain  oxyCODONE    IR 5 milliGRAM(s) Oral every 4 hours PRN Mild Pain      CONSTITUTIONAL: No weakness, fevers or chills  EYES/ENT: No visual changes;  No vertigo or throat pain   NECK: No pain or stiffness  RESPIRATORY: No cough, wheezing, hemoptysis; No shortness of breath  CARDIOVASCULAR: No chest pain or palpitations  GASTROINTESTINAL: No abdominal or epigastric pain. No nausea, vomiting, or hematemesis; No diarrhea or constipation. No melena or hematochezia.  GENITOURINARY: No dysuria, frequency or hematuria  NEUROLOGICAL: No numbness or weakness  SKIN: No itching, burning, rashes, or lesions   MUSCULOSKELETAL: Left leg pain    Vital Signs Last 24 Hrs  T(C): 36.7 (19 Dec 2017 07:35), Max: 36.9 (18 Dec 2017 14:13)  T(F): 98.1 (19 Dec 2017 07:35), Max: 98.4 (18 Dec 2017 14:13)  HR: 72 (19 Dec 2017 12:41) (70 - 80)  BP: 109/68 (19 Dec 2017 12:41) (100/64 - 109/68)  BP(mean): --  RR: 18 (19 Dec 2017 07:35) (18 - 18)  SpO2: 99% (19 Dec 2017 12:41) (97% - 100%)  LABS:      PHYSICAL EXAM:  GENERAL: NAD, well-groomed, well-developed  HEAD:  Atraumatic, Normocephalic  EYES: EOMI, PERRLA, conjunctiva and sclera clear  ENMT: No tonsillar erythema, exudates, or enlargement; Moist mucous membranes, Good dentition, No lesions  NECK: Supple, No JVD, Normal thyroid  NERVOUS SYSTEM:  Alert & Oriented X3, Good concentration; Motor Strength 5/5 B/L upper and lower extremities; DTRs 2+ intact and symmetric  CHEST/LUNG: Clear to percussion bilaterally; No rales, rhonchi, wheezing, or rubs  HEART: Regular rate and rhythm; No murmurs, rubs, or gallops  ABDOMEN: Soft, Nontender, Nondistended; Bowel sounds present  EXTREMITIES: shortening and external rotation of left leg  LYMPH: No lymphadenopathy noted  SKIN: No rashes or lesions          CBC Full  -  ( 19 Dec 2017 08:57 )  WBC Count : 4.44 K/uL  Hemoglobin : 10.2 g/dL  Hematocrit : 33.0 %  Platelet Count - Automated : 147 K/uL  Mean Cell Volume : 99.1 fl  Mean Cell Hemoglobin : 30.6 pg  Mean Cell Hemoglobin Concentration : 30.9 gm/dL  Auto Neutrophil # : x  Auto Lymphocyte # : x  Auto Monocyte # : x  Auto Eosinophil # : x  Auto Basophil # : x  Auto Neutrophil % : x  Auto Lymphocyte % : x  Auto Monocyte % : x  Auto Eosinophil % : x  Auto Basophil % : x    12-19    142  |  110<H>  |  11  ----------------------------<  101<H>  3.5   |  23  |  0.47<L>    Ca    8.1<L>      19 Dec 2017 08:51                    CAPILLARY BLOOD GLUCOSE          RADIOLOGY & ADDITIONAL TESTS:

## 2017-12-19 NOTE — DISCHARGE NOTE ADULT - CARE PLAN
Principal Discharge DX:	Closed fracture of neck of left femur, initial encounter  Goal:	Painless ambulation. Return to normal activities.  Instructions for follow-up, activity and diet:	Follow up with Dr. Funk in 10-14 days. Keep dressing dry. Dressing change/staple removal to be done in office on follow up appointment. You may Bear Weight on Right Lower Extremity and Toe Touch Weight Bear on Left Lower Extremity.   Diet as above. Principal Discharge DX:	Closed fracture of neck of left femur, initial encounter  Goal:	Painless ambulation. Return to normal activities.  Instructions for follow-up, activity and diet:	Follow up with Dr. Funk in 10-14 days.   Keep dressing dry.   Dressing change/staple removal to be done in office on follow up appointment.   You may Bear Weight on Right Lower Extremity and Toe Touch Weight Bear on Left Lower Extremity.   Diet as above. Principal Discharge DX:	Closed fracture of neck of left femur, initial encounter  Goal:	Painless ambulation. Return to normal activities.  Instructions for follow-up, activity and diet:	Follow up with Dr. Funk in 10-14 days.   Keep dressing dry.  change dressing EVERY 2 days IF NECESSARY until DRY   You may Bear Weight on Right Lower Extremity and Toe Touch Weight Bear on Left Lower Extremity.     NO active heel lifts or hip range of motion  Diet as above. Principal Discharge DX:	Closed fracture of neck of left femur, initial encounter  Goal:	Painless ambulation. Return to normal activities.  Instructions for follow-up, activity and diet:	Follow up with Dr. Funk in 10-14 days.   Keep dressing dry.  change dressing EVERY 2 days IF NECESSARY until DRY   You may Bear Weight on Right Lower Extremity and Toe Touch Weight Bear on Left Lower Extremity.     NO active heel lifts or hip range of motion  Diet as above.  Instructions for follow-up, activity and diet:	F/U with MD orders  Instructions for follow-up, activity and diet:	F/U with MD orders

## 2017-12-19 NOTE — PROGRESS NOTE ADULT - ASSESSMENT
46 yo woman s/p fall with left hip fx s/p ORIF of left hip 46 yo woman s/p fall with left hip fx s/p ORIF of left hip. She states she slipped and fell last night in her garage. Patient was brought to [facility] by ambulance. In ED pt was found to have a left hip fx. Pt is S/P  ORIF of the left hip and complaining of pain and difficulty walking. The patient will be going home when more ambulatory

## 2017-12-19 NOTE — DISCHARGE NOTE ADULT - CARE PROVIDER_API CALL
Mikey Funk (MD), Orthopaedic Surgery  [address] [place] [id number]  Phone: [phone]  Fax: [phone]

## 2017-12-19 NOTE — DISCHARGE NOTE ADULT - HOME CARE AGENCY
[facility] [phone]  Visiting nurse will call & arrange home care visit for day after discharge.

## 2017-12-19 NOTE — DISCHARGE NOTE ADULT - REASON FOR ADMISSION
mechanical fall  S/P closed reduction and percutaneous pinning of left valgus-impacted femoral neck fracture

## 2017-12-19 NOTE — DISCHARGE NOTE ADULT - MEDICATION SUMMARY - MEDICATIONS TO TAKE
I will START or STAY ON the medications listed below when I get home from the hospital:    acetaminophen 325 mg oral tablet  -- 2 tab(s) by mouth every 6 hours, As needed, For Temp greater than 38 C (100.4 F)  -- Indication: For fever  / pain    aspirin 325 mg oral delayed release tablet  -- 1 tab(s) by mouth 2 times a day x 6 WEEKS Total (blood thinner) then STOP   -- Indication: For blood thinner    oxyCODONE 5 mg oral tablet  -- 1-2 tab(s) by mouth every 6 hours, As Needed MDD:8  -- Indication: For Pain    docusate sodium 100 mg oral capsule  -- 1 cap(s) by mouth 3 times a day   Purchase over-the-counter Colace 100mg and continue to take three times-a-day to prevent constipation while on pain meds.    -- Indication: For stool softener    senna oral tablet  -- 2 tab(s) by mouth once a day (at bedtime)  while on pain medications   -- Indication: For laxative    Multiple Vitamins oral tablet  -- 1 tab(s) by mouth once a day  -- Indication: For supplement    calcium-vitamin D 500 mg-200 intl units oral tablet  -- 1 tab(s) by mouth every 12 hours  -- Indication: For supplement

## 2017-12-19 NOTE — PROGRESS NOTE ADULT - ASSESSMENT
47F with right pubic ramus fracture, s/p left hip CRPP POD# 2    TTWB left LE  WBAT right LE  No active heel lifts  No hip ROM exercises  Pain control  DVT prophylaxis  PT  Ortho stable for discharge

## 2017-12-19 NOTE — DISCHARGE NOTE ADULT - PLAN OF CARE
Painless ambulation. Return to normal activities. Follow up with Dr. Funk in 10-14 days. Keep dressing dry. Dressing change/staple removal to be done in office on follow up appointment. You may Bear Weight on Right Lower Extremity and Toe Touch Weight Bear on Left Lower Extremity.   Diet as above. Follow up with Dr. Funk in 10-14 days.   Keep dressing dry.   Dressing change/staple removal to be done in office on follow up appointment.   You may Bear Weight on Right Lower Extremity and Toe Touch Weight Bear on Left Lower Extremity.   Diet as above. Follow up with Dr. Funk in 10-14 days.   Keep dressing dry.  change dressing EVERY 2 days IF NECESSARY until DRY   You may Bear Weight on Right Lower Extremity and Toe Touch Weight Bear on Left Lower Extremity.     NO active heel lifts or hip range of motion  Diet as above. F/U with MD orders

## 2017-12-19 NOTE — DISCHARGE NOTE ADULT - DURABLE MEDICAL EQUIPMENT AGENCY
[facility] [phone] delivered rolling walker, shower bench & wheelchair to the hospital & will deliver raised toilet seat with arms to your home

## 2017-12-19 NOTE — DISCHARGE NOTE ADULT - NS AS ACTIVITY OBS
Walking-Outdoors allowed/No Heavy lifting/straining/Showering allowed/Walking-Indoors allowed/Do not make important decisions/Do not drive or operate machinery/Stairs allowed

## 2017-12-19 NOTE — PROGRESS NOTE ADULT - SUBJECTIVE AND OBJECTIVE BOX
Patient seen and examined. Pain controlled.    Physical exam  VS: Afebrile, vital signs stable  Gen: NAD  Left LE: Dressing clean, dry, and intact. +EHL/FHL/TA/GSC. SILT L3-S1. +Dorsalis pedis, capillary refill brisk. Compartments soft and compressible.

## 2017-12-19 NOTE — DISCHARGE NOTE ADULT - HOSPITAL COURSE
Admit: 12/17/17  Diagnosis: Left Valgus impacted Femoral Neck Fracture  Procedure: closed reduction and percutaneous pinning of left valgus-impacted femoral neck fracture  Surgeon: Dr. Funk Admit: 12/17/17  Diagnosis: Left Valgus impacted Femoral Neck Fracture  Procedure: closed reduction and percutaneous pinning of left valgus-impacted femoral neck fracture  Surgeon: Dr. Funk      History of Present Illness:  Chief Complaint/Reason for Admission: mechanical fall  History of Present Illness:   47y Female community ambulator w/o assistive devices presents c/o L hip pain and inability to ambulate sp mechanical fall. Denies HS/LOC. Denies numbness/tingling. Denies fever/chills. Denies pain/injury elsewhere.     PAST MEDICAL & SURGICAL HISTORY:  C Section X2    Hospital Course:  12/17/17: Patient brought in by ambulance after slipping and falling in her garage. In the emergency room the patient was found to have a left hip fx. Patient was evaluated by medicine and cleared for surgery. Patient underwent   closed reduction and percutaneous pinning of left valgus-impacted femoral neck fracture. Patient tolerated the procedure well, no complications. Patient was transferred from Recovery Room to [address] [place] [address].  12/18/17: Patient was evaluated by Physical Therapy and recommended Home for discharge disposition. Admit: 12/17/17  Diagnosis: Left Valgus impacted Femoral Neck Fracture  Procedure: closed reduction and percutaneous pinning of left valgus-impacted femoral neck fracture  Surgeon: Dr. Funk      History of Present Illness:  Chief Complaint/Reason for Admission: mechanical fall  History of Present Illness:   47y Female community ambulator w/o assistive devices presents c/o L hip pain and inability to ambulate sp mechanical fall. Denies HS/LOC. Denies numbness/tingling. Denies fever/chills. Denies pain/injury elsewhere.     PAST MEDICAL & SURGICAL HISTORY:  C Section X2    Hospital Course:  12/17/17: Patient brought in by ambulance after slipping and falling in her garage. In the emergency room the patient was found to have a left hip fx. Patient was evaluated by medicine and cleared for surgery. Patient underwent   closed reduction and percutaneous pinning of left valgus-impacted femoral neck fracture. Patient tolerated the procedure well, no complications. Patient was transferred from Recovery Room to [address] [place] [address].  12/18/17: Patient was evaluated by Physical Therapy and recommended Home for discharge disposition.  12/20:  Pt stable for d/c home    Follow up Dr Funk in office

## 2017-12-20 VITALS
OXYGEN SATURATION: 95 % | RESPIRATION RATE: 18 BRPM | SYSTOLIC BLOOD PRESSURE: 109 MMHG | HEART RATE: 69 BPM | TEMPERATURE: 98 F | DIASTOLIC BLOOD PRESSURE: 69 MMHG

## 2017-12-20 PROCEDURE — 72192 CT PELVIS W/O DYE: CPT

## 2017-12-20 PROCEDURE — 97530 THERAPEUTIC ACTIVITIES: CPT

## 2017-12-20 PROCEDURE — 73502 X-RAY EXAM HIP UNI 2-3 VIEWS: CPT

## 2017-12-20 PROCEDURE — 87086 URINE CULTURE/COLONY COUNT: CPT

## 2017-12-20 PROCEDURE — 97116 GAIT TRAINING THERAPY: CPT

## 2017-12-20 PROCEDURE — C1713: CPT

## 2017-12-20 PROCEDURE — 85730 THROMBOPLASTIN TIME PARTIAL: CPT

## 2017-12-20 PROCEDURE — 96372 THER/PROPH/DIAG INJ SC/IM: CPT | Mod: XU

## 2017-12-20 PROCEDURE — 86850 RBC ANTIBODY SCREEN: CPT

## 2017-12-20 PROCEDURE — 93005 ELECTROCARDIOGRAM TRACING: CPT

## 2017-12-20 PROCEDURE — 76000 FLUOROSCOPY <1 HR PHYS/QHP: CPT

## 2017-12-20 PROCEDURE — 81001 URINALYSIS AUTO W/SCOPE: CPT

## 2017-12-20 PROCEDURE — 85027 COMPLETE CBC AUTOMATED: CPT

## 2017-12-20 PROCEDURE — 76377 3D RENDER W/INTRP POSTPROCES: CPT

## 2017-12-20 PROCEDURE — 80053 COMPREHEN METABOLIC PANEL: CPT

## 2017-12-20 PROCEDURE — 71045 X-RAY EXAM CHEST 1 VIEW: CPT

## 2017-12-20 PROCEDURE — 85610 PROTHROMBIN TIME: CPT

## 2017-12-20 PROCEDURE — 99285 EMERGENCY DEPT VISIT HI MDM: CPT | Mod: 25

## 2017-12-20 PROCEDURE — 86900 BLOOD TYPING SEROLOGIC ABO: CPT

## 2017-12-20 PROCEDURE — 86901 BLOOD TYPING SEROLOGIC RH(D): CPT

## 2017-12-20 PROCEDURE — 80048 BASIC METABOLIC PNL TOTAL CA: CPT

## 2017-12-20 PROCEDURE — 97161 PT EVAL LOW COMPLEX 20 MIN: CPT

## 2017-12-20 PROCEDURE — 73552 X-RAY EXAM OF FEMUR 2/>: CPT

## 2017-12-20 PROCEDURE — 84702 CHORIONIC GONADOTROPIN TEST: CPT

## 2017-12-20 PROCEDURE — 96374 THER/PROPH/DIAG INJ IV PUSH: CPT

## 2017-12-20 PROCEDURE — 97165 OT EVAL LOW COMPLEX 30 MIN: CPT

## 2017-12-20 RX ORDER — OXYCODONE HYDROCHLORIDE 5 MG/1
1 TABLET ORAL
Qty: 55 | Refills: 0 | OUTPATIENT
Start: 2017-12-20

## 2017-12-20 RX ADMIN — Medication 1 TABLET(S): at 12:02

## 2017-12-20 RX ADMIN — Medication 325 MILLIGRAM(S): at 06:28

## 2017-12-20 RX ADMIN — Medication 325 MILLIGRAM(S): at 12:02

## 2017-12-20 RX ADMIN — Medication 100 MILLIGRAM(S): at 06:28

## 2017-12-20 RX ADMIN — Medication 500 MILLIGRAM(S): at 06:28

## 2017-12-20 RX ADMIN — Medication 1 TABLET(S): at 12:04

## 2017-12-20 RX ADMIN — Medication 1 MILLIGRAM(S): at 12:02

## 2017-12-20 NOTE — PROGRESS NOTE ADULT - ATTENDING COMMENTS
DC home   continue current meds  PO as tolerated  activity as per ortho  Patient and  aware of plan
Beginning to ambulate and doing well. No medical complications post-op to date and to proceed with physical therapy, as tolerated. Continues pulmonary toilet to lessen atelectasis, leg exercises as taught to lessen the risk of DVT and supervised pain medications for post-op pain control. I anticipate discharge to home to follow when cleared by orthopedics.

## 2017-12-20 NOTE — PROGRESS NOTE ADULT - ASSESSMENT
Assessment/Plan:  47yFemale now POD3 from L valgus impacted femoral neck fracture s/p CRPP. She is doing well.     -pain control  -PT  -WBAT  -OOB  -DVT ppx  -dispo plan: home today

## 2017-12-20 NOTE — PROGRESS NOTE ADULT - SUBJECTIVE AND OBJECTIVE BOX
Patient is a 48 yo woman who states she slipped and fell last night in her garage. Patient was brought to [facility] by ambulance. In ED pt was found to have a left hip fx. Pt is S/P  ORIF of the left hip and complaining of pain and difficulty walking. The patient will be going home when more ambulatory                                                          	    MEDICATIONS  (STANDING):  ascorbic acid 500 milliGRAM(s) Oral two times a day  aspirin enteric coated 325 milliGRAM(s) Oral two times a day  docusate sodium 100 milliGRAM(s) Oral three times a day  ferrous    sulfate 325 milliGRAM(s) Oral three times a day with meals  folic acid 1 milliGRAM(s) Oral daily  multivitamin 1 Tablet(s) Oral daily  senna 2 Tablet(s) Oral at bedtime  sodium chloride 0.9%. 1000 milliLiter(s) (75 mL/Hr) IV Continuous <Continuous>    MEDICATIONS  (PRN):  acetaminophen   Tablet 650 milliGRAM(s) Oral every 6 hours PRN For Temp greater than 38 C (100.4 F)  acetaminophen   Tablet. 650 milliGRAM(s) Oral every 6 hours PRN Moderate Pain (4 - 6)  HYDROmorphone  Injectable 1 milliGRAM(s) IV Push every 3 hours PRN Severe Pain (7 - 10)  ondansetron Injectable 4 milliGRAM(s) IV Push every 6 hours PRN Nausea and/or Vomiting  oxyCODONE    IR 10 milliGRAM(s) Oral every 4 hours PRN Moderate Pain  oxyCODONE    IR 5 milliGRAM(s) Oral every 4 hours PRN Mild Pain      CONSTITUTIONAL: No weakness, fevers or chills  EYES/ENT: No visual changes;  No vertigo or throat pain   NECK: No pain or stiffness  RESPIRATORY: No cough, wheezing, hemoptysis; No shortness of breath  CARDIOVASCULAR: No chest pain or palpitations  GASTROINTESTINAL: No abdominal or epigastric pain. No nausea, vomiting, or hematemesis; No diarrhea or constipation. No melena or hematochezia.  GENITOURINARY: No dysuria, frequency or hematuria  NEUROLOGICAL: No numbness or weakness  SKIN: No itching, burning, rashes, or lesions   MUSCULOSKELETAL: Left leg pain    Vital Signs Last 24 Hrs  T(C): 36.7 (19 Dec 2017 07:35), Max: 36.9 (18 Dec 2017 14:13)  T(F): 98.1 (19 Dec 2017 07:35), Max: 98.4 (18 Dec 2017 14:13)  HR: 72 (19 Dec 2017 12:41) (70 - 80)  BP: 109/68 (19 Dec 2017 12:41) (100/64 - 109/68)  BP(mean): --  RR: 18 (19 Dec 2017 07:35) (18 - 18)  SpO2: 99% (19 Dec 2017 12:41) (97% - 100%)  LABS:      PHYSICAL EXAM:  GENERAL: NAD, well-groomed, well-developed  HEAD:  Atraumatic, Normocephalic  EYES: EOMI, PERRLA, conjunctiva and sclera clear  ENMT: No tonsillar erythema, exudates, or enlargement; Moist mucous membranes, Good dentition, No lesions  NECK: Supple, No JVD, Normal thyroid  NERVOUS SYSTEM:  Alert & Oriented X3, Good concentration; Motor Strength 5/5 B/L upper and lower extremities; DTRs 2+ intact and symmetric  CHEST/LUNG: Clear to percussion bilaterally; No rales, rhonchi, wheezing, or rubs  HEART: Regular rate and rhythm; No murmurs, rubs, or gallops  ABDOMEN: Soft, Nontender, Nondistended; Bowel sounds present  EXTREMITIES: shortening and external rotation of left leg  LYMPH: No lymphadenopathy noted  SKIN: No rashes or lesions          CBC Full  -  ( 19 Dec 2017 08:57 )  WBC Count : 4.44 K/uL  Hemoglobin : 10.2 g/dL  Hematocrit : 33.0 %  Platelet Count - Automated : 147 K/uL  Mean Cell Volume : 99.1 fl  Mean Cell Hemoglobin : 30.6 pg  Mean Cell Hemoglobin Concentration : 30.9 gm/dL  Auto Neutrophil # : x  Auto Lymphocyte # : x  Auto Monocyte # : x  Auto Eosinophil # : x  Auto Basophil # : x  Auto Neutrophil % : x  Auto Lymphocyte % : x  Auto Monocyte % : x  Auto Eosinophil % : x  Auto Basophil % : x    12-19    142  |  110<H>  |  11  ----------------------------<  101<H>  3.5   |  23  |  0.47<L>    Ca    8.1<L>      19 Dec 2017 08:51                    CAPILLARY BLOOD GLUCOSE          RADIOLOGY & ADDITIONAL TESTS:

## 2017-12-20 NOTE — PROGRESS NOTE ADULT - SUBJECTIVE AND OBJECTIVE BOX
No acute events overnight. Pain controlled.     PE:  Vital Signs Last 24 Hrs  T(C): 36.6 (20 Dec 2017 04:32), Max: 36.9 (20 Dec 2017 00:47)  T(F): 97.9 (20 Dec 2017 04:32), Max: 98.4 (20 Dec 2017 00:47)  HR: 68 (20 Dec 2017 04:32) (68 - 73)  BP: 107/70 (20 Dec 2017 04:32) (100/67 - 109/68)  BP(mean): --  RR: 18 (20 Dec 2017 04:32) (18 - 18)  SpO2: 98% (20 Dec 2017 04:32) (98% - 99%)    Gen: No acute distress  Exam:  Gen: NAD  RLE/LLE:  Motor intact EHL/FHL/TA/Gastrocnemius  Sensory: SILT DP/SP/S/S/T nerve distributions  Vascular: 2+ Dorsalis Pedis pulse      Labs:                        10.2   4.44  )-----------( 147      ( 19 Dec 2017 08:57 )             33.0   12-19    142  |  110<H>  |  11  ----------------------------<  101<H>  3.5   |  23  |  0.47<L>    Ca    8.1<L>      19 Dec 2017 08:51

## 2017-12-20 NOTE — PROGRESS NOTE ADULT - ASSESSMENT
48 yo woman s/p fall with left hip fx s/p ORIF of left hip. She states she slipped and fell last night in her garage. Patient was brought to [facility] by ambulance. In ED pt was found to have a left hip fx. Pt is S/P  ORIF of the left hip and complaining of pain and difficulty walking. The patient will be going home when more ambulatory

## 2017-12-28 ENCOUNTER — OUTPATIENT (OUTPATIENT)
Dept: OUTPATIENT SERVICES | Facility: HOSPITAL | Age: 47
LOS: 1 days | End: 2017-12-28
Payer: MEDICAID

## 2017-12-28 ENCOUNTER — APPOINTMENT (OUTPATIENT)
Dept: ULTRASOUND IMAGING | Facility: IMAGING CENTER | Age: 47
End: 2017-12-28
Payer: COMMERCIAL

## 2017-12-28 ENCOUNTER — OUTPATIENT (OUTPATIENT)
Dept: OUTPATIENT SERVICES | Facility: HOSPITAL | Age: 47
LOS: 1 days | End: 2017-12-28

## 2017-12-28 DIAGNOSIS — Z00.8 ENCOUNTER FOR OTHER GENERAL EXAMINATION: ICD-10-CM

## 2017-12-28 PROCEDURE — 93971 EXTREMITY STUDY: CPT | Mod: 26,LT

## 2017-12-28 PROCEDURE — 93971 EXTREMITY STUDY: CPT

## 2017-12-30 ENCOUNTER — TRANSCRIPTION ENCOUNTER (OUTPATIENT)
Age: 47
End: 2017-12-30

## 2018-10-12 NOTE — ED PROVIDER NOTE - ENMT NEGATIVE STATEMENT, MLM
no difficulties Ears: no ear pain and no hearing problems.Nose: no nasal congestion and no nasal drainage.Mouth/Throat: no dysphagia, no hoarseness and no throat pain.Neck: no lumps, no pain, no stiffness and no swollen glands.

## 2019-06-24 ENCOUNTER — RESULT REVIEW (OUTPATIENT)
Age: 49
End: 2019-06-24

## 2019-07-28 ENCOUNTER — EMERGENCY (EMERGENCY)
Facility: HOSPITAL | Age: 49
LOS: 1 days | Discharge: HOME | End: 2019-07-28
Attending: EMERGENCY MEDICINE
Payer: COMMERCIAL

## 2019-07-28 VITALS
WEIGHT: 126.99 LBS | RESPIRATION RATE: 18 BRPM | HEIGHT: 61 IN | SYSTOLIC BLOOD PRESSURE: 129 MMHG | TEMPERATURE: 98 F | OXYGEN SATURATION: 100 % | DIASTOLIC BLOOD PRESSURE: 79 MMHG | HEART RATE: 76 BPM

## 2019-07-28 LAB
ALBUMIN SERPL ELPH-MCNC: 4.6 G/DL — SIGNIFICANT CHANGE UP (ref 3.3–5)
ALP SERPL-CCNC: 42 U/L — SIGNIFICANT CHANGE UP (ref 40–120)
ALT FLD-CCNC: 16 U/L — SIGNIFICANT CHANGE UP (ref 10–45)
ANION GAP SERPL CALC-SCNC: 12 MMOL/L — SIGNIFICANT CHANGE UP (ref 5–17)
APTT BLD: 34.1 SEC — SIGNIFICANT CHANGE UP (ref 27.5–36.3)
AST SERPL-CCNC: 15 U/L — SIGNIFICANT CHANGE UP (ref 10–40)
BILIRUB SERPL-MCNC: 0.5 MG/DL — SIGNIFICANT CHANGE UP (ref 0.2–1.2)
BUN SERPL-MCNC: 10 MG/DL — SIGNIFICANT CHANGE UP (ref 7–23)
CALCIUM SERPL-MCNC: 9.4 MG/DL — SIGNIFICANT CHANGE UP (ref 8.4–10.5)
CHLORIDE SERPL-SCNC: 105 MMOL/L — SIGNIFICANT CHANGE UP (ref 96–108)
CO2 SERPL-SCNC: 23 MMOL/L — SIGNIFICANT CHANGE UP (ref 22–31)
CREAT SERPL-MCNC: 0.58 MG/DL — SIGNIFICANT CHANGE UP (ref 0.5–1.3)
GAS PNL BLDV: SIGNIFICANT CHANGE UP
GLUCOSE SERPL-MCNC: 97 MG/DL — SIGNIFICANT CHANGE UP (ref 70–99)
HCG SERPL-ACNC: <2 MIU/ML — SIGNIFICANT CHANGE UP
HCT VFR BLD CALC: 37.2 % — SIGNIFICANT CHANGE UP (ref 34.5–45)
HGB BLD-MCNC: 13.5 G/DL — SIGNIFICANT CHANGE UP (ref 11.5–15.5)
INR BLD: 1.06 RATIO — SIGNIFICANT CHANGE UP (ref 0.88–1.16)
MCHC RBC-ENTMCNC: 33.6 PG — SIGNIFICANT CHANGE UP (ref 27–34)
MCHC RBC-ENTMCNC: 36.3 GM/DL — HIGH (ref 32–36)
MCV RBC AUTO: 92.5 FL — SIGNIFICANT CHANGE UP (ref 80–100)
NT-PROBNP SERPL-SCNC: 19 PG/ML — SIGNIFICANT CHANGE UP (ref 0–300)
PLATELET # BLD AUTO: 206 K/UL — SIGNIFICANT CHANGE UP (ref 150–400)
POTASSIUM SERPL-MCNC: 3.4 MMOL/L — LOW (ref 3.5–5.3)
POTASSIUM SERPL-SCNC: 3.4 MMOL/L — LOW (ref 3.5–5.3)
PROT SERPL-MCNC: 7.4 G/DL — SIGNIFICANT CHANGE UP (ref 6–8.3)
PROTHROM AB SERPL-ACNC: 12.2 SEC — SIGNIFICANT CHANGE UP (ref 10–12.9)
RBC # BLD: 4.02 M/UL — SIGNIFICANT CHANGE UP (ref 3.8–5.2)
RBC # FLD: 11 % — SIGNIFICANT CHANGE UP (ref 10.3–14.5)
SODIUM SERPL-SCNC: 140 MMOL/L — SIGNIFICANT CHANGE UP (ref 135–145)
TROPONIN T, HIGH SENSITIVITY RESULT: <6 NG/L — SIGNIFICANT CHANGE UP (ref 0–51)
WBC # BLD: 5.4 K/UL — SIGNIFICANT CHANGE UP (ref 3.8–10.5)
WBC # FLD AUTO: 5.4 K/UL — SIGNIFICANT CHANGE UP (ref 3.8–10.5)

## 2019-07-28 PROCEDURE — 93005 ELECTROCARDIOGRAM TRACING: CPT

## 2019-07-28 PROCEDURE — 82435 ASSAY OF BLOOD CHLORIDE: CPT

## 2019-07-28 PROCEDURE — 85730 THROMBOPLASTIN TIME PARTIAL: CPT

## 2019-07-28 PROCEDURE — 85014 HEMATOCRIT: CPT

## 2019-07-28 PROCEDURE — 74177 CT ABD & PELVIS W/CONTRAST: CPT | Mod: 26

## 2019-07-28 PROCEDURE — 71045 X-RAY EXAM CHEST 1 VIEW: CPT

## 2019-07-28 PROCEDURE — 84702 CHORIONIC GONADOTROPIN TEST: CPT

## 2019-07-28 PROCEDURE — 85027 COMPLETE CBC AUTOMATED: CPT

## 2019-07-28 PROCEDURE — 84295 ASSAY OF SERUM SODIUM: CPT

## 2019-07-28 PROCEDURE — 83605 ASSAY OF LACTIC ACID: CPT

## 2019-07-28 PROCEDURE — 80053 COMPREHEN METABOLIC PANEL: CPT

## 2019-07-28 PROCEDURE — 82330 ASSAY OF CALCIUM: CPT

## 2019-07-28 PROCEDURE — 71045 X-RAY EXAM CHEST 1 VIEW: CPT | Mod: 26

## 2019-07-28 PROCEDURE — 99285 EMERGENCY DEPT VISIT HI MDM: CPT

## 2019-07-28 PROCEDURE — 82947 ASSAY GLUCOSE BLOOD QUANT: CPT

## 2019-07-28 PROCEDURE — 74177 CT ABD & PELVIS W/CONTRAST: CPT

## 2019-07-28 PROCEDURE — 85610 PROTHROMBIN TIME: CPT

## 2019-07-28 PROCEDURE — 83880 ASSAY OF NATRIURETIC PEPTIDE: CPT

## 2019-07-28 PROCEDURE — 84484 ASSAY OF TROPONIN QUANT: CPT

## 2019-07-28 PROCEDURE — 93010 ELECTROCARDIOGRAM REPORT: CPT

## 2019-07-28 PROCEDURE — 84132 ASSAY OF SERUM POTASSIUM: CPT

## 2019-07-28 PROCEDURE — 82803 BLOOD GASES ANY COMBINATION: CPT

## 2019-07-28 PROCEDURE — 99284 EMERGENCY DEPT VISIT MOD MDM: CPT | Mod: 25

## 2019-07-28 RX ORDER — ASPIRIN/CALCIUM CARB/MAGNESIUM 324 MG
162 TABLET ORAL ONCE
Refills: 0 | Status: COMPLETED | OUTPATIENT
Start: 2019-07-28 | End: 2019-07-28

## 2019-07-28 NOTE — ED PROVIDER NOTE - NSFOLLOWUPINSTRUCTIONS_ED_ALL_ED_FT
You were evaluated in the ER at [facility] for chest and abdominal pain. Your labs, EKG and a CT scan of your abdomen were normal.     If you develop worsening chest pain, shortness of breath, lightheadedness/dizziness, abdominal pain, nausea/vomiting, fevers/chills, diarrhea or inability to tolerate any food/liquids, or any new or worsening symptoms, please return to the ER for further evaluation     Please follow up with your cardiologist tomorrow to schedule your stress test

## 2019-07-28 NOTE — ED ADULT NURSE NOTE - OBJECTIVE STATEMENT
48 y/o female presented to the ED via EMS stating she has chest pain for the past few weeks with no radiation. pt is A&Ox3, on room air with no signs of distress. pt was given 162mg ASA by EMS. EMS stated out in the field 12 lead was NSR with two PVC. BLS brought pt in. pt VSS. pt has hx of left femoral neck fracture s/p ORIF (2017). pt has L sided chest pain, no radiation, intermittent. no chest pain at this time. pt denies any blurry vision, headaches, nausea/vomiting, fevers/chills, cough and weight loss. pt has Cardiac monitor in place. EKG performed. Dr. Clayton next to bedside. awaiting MD dispo

## 2019-07-28 NOTE — ED ADULT NURSE NOTE - CHPI ED NUR SYMPTOMS NEG
no nausea/no shortness of breath/no dizziness/no syncope/no fever/no back pain/no congestion/no chills/no vomiting/no diaphoresis

## 2019-07-28 NOTE — ED PROVIDER NOTE - CLINICAL SUMMARY MEDICAL DECISION MAKING FREE TEXT BOX
49F h/o left femoral neck fracture s/p ORIF (2017) p/w chest pain. HD normal. Exam WNL. Low-risk for ACS and PE. More likely anxiety.   - F/u EKG, troponin, labs, CXR 49F h/o left femoral neck fracture s/p ORIF (2017) p/w chest pain. HD normal. Exam WNL. Low-risk for ACS and PE. More likely anxiety.   - F/u EKG, troponin, labs, CXR  - F/u CT A/P - r/o malignancy

## 2019-07-28 NOTE — ED ADULT NURSE NOTE - PAIN: BODY LOCATION
[facility]  Phone: [phone]  OUTSIDE TESTING RESULT REQUEST      TO:  Dr. Pilar Cid and staff    Today's Date: 5/4/21    FAX #: [phone]     IMPORTANT: FOR YOUR IMMEDIATE ATTENTION  Please FAX all test results liste
chest pain

## 2019-07-28 NOTE — ED ADULT NURSE NOTE - NSIMPLEMENTINTERV_GEN_ALL_ED
Implemented All Universal Safety Interventions:  [place] to call system. Call bell, personal items and telephone within reach. Instruct patient to call for assistance. Room bathroom lighting operational. Non-slip footwear when patient is off stretcher. Physically safe environment: no spills, clutter or unnecessary equipment. Stretcher in lowest position, wheels locked, appropriate side rails in place.

## 2019-07-28 NOTE — ED PROVIDER NOTE - OBJECTIVE STATEMENT
49F h/o left femoral neck fracture s/p ORIF (2017) p/w chest pain. 49F h/o left femoral neck fracture s/p ORIF (2017) p/w chest pain. Pt states she was returning 49F h/o left femoral neck fracture s/p ORIF (2017) p/w chest pain. Pt states she has generally been feeling unwell, weak and has intermittent abdominal pains for months. This evening she was returning home and developed sudden onset sharp L-sided chest pain that prompted her family to call EMS. The chest pain has since resolved, but was initially associated with a brief episode of back pain and left arm pain. The pain was unchanged with exertion. Pt endorses dizziness and states she has had intermittent palpitations since June and had an abnormal EKG when she last went to see her cardiologist (Dr. Ang). Pt has an appointment scheduled with him tomorrow to schedule a stress test. Pt denies diaphoresis, blurry vision, headaches, nausea/vomiting, fevers/chills, cough. 49F h/o left femoral neck fracture s/p ORIF (2017) p/w chest pain. Pt states she has generally been feeling unwell, weak and has intermittent abdominal pains for months. This evening she was returning home and developed sudden onset sharp L-sided chest pain that prompted her family to call EMS. The chest pain has since resolved, but was initially associated with a brief episode of back pain and left arm pain. The pain was unchanged with exertion. Pt endorses dizziness and states she has had intermittent palpitations since June and had an abnormal EKG when she last went to see her cardiologist (Dr. Ang). Pt has an appointment scheduled with him tomorrow to schedule a stress test. Pt denies diaphoresis, blurry vision, headaches, nausea/vomiting, fevers/chills, cough.    PMD- Dr. Sheldon Callahan 49F h/o left femoral neck fracture s/p ORIF (2017) p/w chest pain. Pt states she has generally been feeling unwell, weak and has intermittent abdominal pains and bloating for months. This evening she was returning home and developed sudden onset sharp L-sided chest pain that prompted her family to call EMS. The chest pain has since resolved, but was initially associated with a brief episode of back pain and left arm pain. The pain was unchanged with exertion. Pt endorses dizziness and states she has had intermittent palpitations since June and had an abnormal EKG when she last went to see her cardiologist (Dr. Ang). Pt has an appointment scheduled with him tomorrow to schedule a stress test. Pt denies diaphoresis, blurry vision, headaches, nausea/vomiting, fevers/chills, cough and weight loss.     PMD- Dr. Sheldon Callahan

## 2019-07-28 NOTE — ED PROVIDER NOTE - ATTENDING CONTRIBUTION TO CARE
vague worsening abdominal upset - months. now w cp not pleuritic  rrr, non-tender abdomen.  dissection / pe unlikely. ro acs. concern for slow intra-abd process, will ctap.

## 2019-07-29 VITALS
DIASTOLIC BLOOD PRESSURE: 69 MMHG | OXYGEN SATURATION: 95 % | HEART RATE: 65 BPM | TEMPERATURE: 98 F | SYSTOLIC BLOOD PRESSURE: 106 MMHG | RESPIRATION RATE: 20 BRPM

## 2019-08-14 NOTE — PRE-OP CHECKLIST - NOTHING BY MOUTH SINCE
Subjective   Sultana Watt is a 86 y.o. male.     Chief Complaint   Patient presents with   • Weight Loss     x 2 years        History of Present Illness   Has been loosing weight for 2 years since his wife , moods are not depressed, he just has no appetite. Has lost 2 pounds in the last 3 months. Feels good.     htn-doing well on meds    Bph/cbp- on meds and doping well, stopped following with urology, declines psa    gerd- doing well on meds    Anticoag/valve- followed by cardiology    Alps- we are to follow labs        The following portions of the patient's history were reviewed and updated as appropriate: allergies, current medications, past family history, past medical history, past social history, past surgical history and problem list.    Past Medical History:   Diagnosis Date   • Abnormal urine    • Acid reflux    • Acute bronchitis    • Acute sinusitis     Recurrence not specified , unspecified location.    • Arthralgia of multiple sites    • Arthritis    • Asthma    • Benign prostate hyperplasia    • Benign unconjugated hyperbilirubinemia    • Bilateral hearing loss    • Cervical radiculopathy    • Chronic bacterial prostatitis    • Cigarette nicotine dependence    • Common bile duct (CBD) stricture     Abnormal CBD    • Community acquired pneumonia    • Dermatitis    • Elevated blood pressure reading without diagnosis of hypertension    • Fatigue     Unspecified type    • Fever    • Flank pain    • Fungal infection    • Headache    • Hematuria    • High risk medication use    • History of artificial heart valve    • History of cataract    • HTN (hypertension)    • Hypercholesterolemia    • Hyperglycemia    • Hypertension    • Hypertrophy of prostate     Benign prostate hypertrophy    • Hypogonadism male    • Inability to attain erection    • Increased urinary frequency    • Lumbar radiculopathy    • Myalgia     Myalgia and myositis    • Neck pain    • Nephrolithiasis    • Nocturia    • Skin tag    •  "Tachycardia    • Tinnitus of both ears        Past Surgical History:   Procedure Laterality Date   • BREAST LUMPECTOMY     • CHOLECYSTECTOMY     • INGUINAL HERNIA REPAIR         Family History   Problem Relation Age of Onset   • Heart failure Father         Congestive heart failure        Social History     Socioeconomic History   • Marital status:      Spouse name: Not on file   • Number of children: Not on file   • Years of education: Not on file   • Highest education level: Not on file   Tobacco Use   • Smoking status: Never Smoker   • Tobacco comment: Cigarette nicotine dependence    Substance and Sexual Activity   • Alcohol use: Yes     Comment: Drinks alcohol seven or less drinks per week    • Drug use: No       Review of Systems   Respiratory: Negative for shortness of breath.    Cardiovascular: Negative for chest pain.       Objective   Visit Vitals  /70   Pulse 72   Temp 98.2 °F (36.8 °C) (Temporal)   Ht 170.2 cm (67\")   Wt 72.6 kg (160 lb)   SpO2 96%   BMI 25.06 kg/m²     Body mass index is 25.06 kg/m².  Physical Exam   Constitutional: He is oriented to person, place, and time. He appears well-developed and well-nourished.   Cardiovascular: Normal rate, regular rhythm, normal heart sounds and intact distal pulses.   Pulmonary/Chest: Effort normal and breath sounds normal.   Musculoskeletal: Normal range of motion. He exhibits no edema.   Neurological: He is alert and oriented to person, place, and time.   Skin: Skin is warm and dry.   Psychiatric: He has a normal mood and affect. His behavior is normal.         Assessment/Plan   Sultana was seen today for weight loss.    Diagnoses and all orders for this visit:    Essential hypertension    Hypercholesterolemia  -     Lipid Panel  -     Comprehensive Metabolic Panel    Gastroesophageal reflux disease without esophagitis    Benign prostatic hyperplasia with nocturia    Chronic bacterial prostatitis    ALPS (autoimmune lymphoproliferative " syndrome) (CMS/HCC)  -     CBC & Differential    Appetite absent  -     mirtazapine (REMERON) 15 MG tablet; Take 1 tablet by mouth Every Night.             R and B discussed and f/u in 3 months and will get medicare wellness exam. Discussed boost.    16-Dec-2017 23:00

## 2019-11-01 NOTE — ED ADULT NURSE NOTE - NSSISCREENINGQ5_ED_A_ED
Sodium is stable at this time  Mild decrease and asymptomatic    Likely due to decreased free water clearance No

## 2020-01-12 NOTE — PATIENT PROFILE ADULT. - PATIENT REPRESENTATIVE NAME
Problem: Infection  Goal: Will remain free from infection  Outcome: PROGRESSING AS EXPECTED  Intervention: Implement standard precautions and perform hand washing before and after patient contact  Note:   Standard precautions in place, hand washing done prior to and before pt care.      Problem: Pain Management  Goal: Pain level will decrease to patient's comfort goal  Outcome: PROGRESSING AS EXPECTED  Intervention: Follow pain managment plan developed in collaboration with patient and Interdisciplinary Team  Note:   Pain management protocol being  followed and pt routinely assessed for pain.       Gonsalo Oconnor,

## 2020-07-16 PROBLEM — Z78.9 OTHER SPECIFIED HEALTH STATUS: Chronic | Status: ACTIVE | Noted: 2019-07-30

## 2020-07-21 ENCOUNTER — EMERGENCY (EMERGENCY)
Facility: HOSPITAL | Age: 50
LOS: 1 days | Discharge: HOME | End: 2020-07-21
Attending: EMERGENCY MEDICINE
Payer: MEDICAID

## 2020-07-21 VITALS
HEIGHT: 61 IN | WEIGHT: 117.95 LBS | SYSTOLIC BLOOD PRESSURE: 127 MMHG | HEART RATE: 81 BPM | DIASTOLIC BLOOD PRESSURE: 81 MMHG | RESPIRATION RATE: 19 BRPM | TEMPERATURE: 99 F | OXYGEN SATURATION: 100 %

## 2020-07-21 LAB
ALBUMIN SERPL ELPH-MCNC: 4.5 G/DL — SIGNIFICANT CHANGE UP (ref 3.3–5)
ALP SERPL-CCNC: 40 U/L — SIGNIFICANT CHANGE UP (ref 40–120)
ALT FLD-CCNC: 14 U/L — SIGNIFICANT CHANGE UP (ref 10–45)
ANION GAP SERPL CALC-SCNC: 12 MMOL/L — SIGNIFICANT CHANGE UP (ref 5–17)
AST SERPL-CCNC: 16 U/L — SIGNIFICANT CHANGE UP (ref 10–40)
BASOPHILS # BLD AUTO: 0.03 K/UL — SIGNIFICANT CHANGE UP (ref 0–0.2)
BASOPHILS NFR BLD AUTO: 0.7 % — SIGNIFICANT CHANGE UP (ref 0–2)
BILIRUB SERPL-MCNC: 0.4 MG/DL — SIGNIFICANT CHANGE UP (ref 0.2–1.2)
BUN SERPL-MCNC: 8 MG/DL — SIGNIFICANT CHANGE UP (ref 7–23)
CALCIUM SERPL-MCNC: 9.5 MG/DL — SIGNIFICANT CHANGE UP (ref 8.4–10.5)
CHLORIDE SERPL-SCNC: 106 MMOL/L — SIGNIFICANT CHANGE UP (ref 96–108)
CO2 SERPL-SCNC: 21 MMOL/L — LOW (ref 22–31)
CREAT SERPL-MCNC: 0.63 MG/DL — SIGNIFICANT CHANGE UP (ref 0.5–1.3)
EOSINOPHIL # BLD AUTO: 0.03 K/UL — SIGNIFICANT CHANGE UP (ref 0–0.5)
EOSINOPHIL NFR BLD AUTO: 0.7 % — SIGNIFICANT CHANGE UP (ref 0–6)
GLUCOSE SERPL-MCNC: 110 MG/DL — HIGH (ref 70–99)
HCT VFR BLD CALC: 35.3 % — SIGNIFICANT CHANGE UP (ref 34.5–45)
HGB BLD-MCNC: 11.4 G/DL — LOW (ref 11.5–15.5)
IMM GRANULOCYTES NFR BLD AUTO: 0.2 % — SIGNIFICANT CHANGE UP (ref 0–1.5)
LIDOCAIN IGE QN: 29 U/L — SIGNIFICANT CHANGE UP (ref 7–60)
LYMPHOCYTES # BLD AUTO: 1.74 K/UL — SIGNIFICANT CHANGE UP (ref 1–3.3)
LYMPHOCYTES # BLD AUTO: 39.5 % — SIGNIFICANT CHANGE UP (ref 13–44)
MCHC RBC-ENTMCNC: 30.6 PG — SIGNIFICANT CHANGE UP (ref 27–34)
MCHC RBC-ENTMCNC: 32.3 GM/DL — SIGNIFICANT CHANGE UP (ref 32–36)
MCV RBC AUTO: 94.6 FL — SIGNIFICANT CHANGE UP (ref 80–100)
MONOCYTES # BLD AUTO: 0.27 K/UL — SIGNIFICANT CHANGE UP (ref 0–0.9)
MONOCYTES NFR BLD AUTO: 6.1 % — SIGNIFICANT CHANGE UP (ref 2–14)
NEUTROPHILS # BLD AUTO: 2.32 K/UL — SIGNIFICANT CHANGE UP (ref 1.8–7.4)
NEUTROPHILS NFR BLD AUTO: 52.8 % — SIGNIFICANT CHANGE UP (ref 43–77)
NRBC # BLD: 0 /100 WBCS — SIGNIFICANT CHANGE UP (ref 0–0)
PLATELET # BLD AUTO: 207 K/UL — SIGNIFICANT CHANGE UP (ref 150–400)
POTASSIUM SERPL-MCNC: 3.4 MMOL/L — LOW (ref 3.5–5.3)
POTASSIUM SERPL-SCNC: 3.4 MMOL/L — LOW (ref 3.5–5.3)
PROT SERPL-MCNC: 6.8 G/DL — SIGNIFICANT CHANGE UP (ref 6–8.3)
RBC # BLD: 3.73 M/UL — LOW (ref 3.8–5.2)
RBC # FLD: 12.1 % — SIGNIFICANT CHANGE UP (ref 10.3–14.5)
SODIUM SERPL-SCNC: 139 MMOL/L — SIGNIFICANT CHANGE UP (ref 135–145)
TROPONIN T, HIGH SENSITIVITY RESULT: <6 NG/L — SIGNIFICANT CHANGE UP (ref 0–51)
WBC # BLD: 4.4 K/UL — SIGNIFICANT CHANGE UP (ref 3.8–10.5)
WBC # FLD AUTO: 4.4 K/UL — SIGNIFICANT CHANGE UP (ref 3.8–10.5)

## 2020-07-21 PROCEDURE — 99285 EMERGENCY DEPT VISIT HI MDM: CPT

## 2020-07-21 PROCEDURE — 93010 ELECTROCARDIOGRAM REPORT: CPT

## 2020-07-21 RX ORDER — LIDOCAINE 4 G/100G
10 CREAM TOPICAL ONCE
Refills: 0 | Status: COMPLETED | OUTPATIENT
Start: 2020-07-21 | End: 2020-07-21

## 2020-07-21 RX ORDER — SIMETHICONE 80 MG/1
160 TABLET, CHEWABLE ORAL ONCE
Refills: 0 | Status: COMPLETED | OUTPATIENT
Start: 2020-07-21 | End: 2020-07-21

## 2020-07-21 RX ORDER — METOCLOPRAMIDE HCL 10 MG
10 TABLET ORAL ONCE
Refills: 0 | Status: COMPLETED | OUTPATIENT
Start: 2020-07-21 | End: 2020-07-21

## 2020-07-21 RX ORDER — SODIUM CHLORIDE 9 MG/ML
1000 INJECTION, SOLUTION INTRAVENOUS ONCE
Refills: 0 | Status: COMPLETED | OUTPATIENT
Start: 2020-07-21 | End: 2020-07-21

## 2020-07-21 RX ORDER — SODIUM CHLORIDE 9 MG/ML
1000 INJECTION, SOLUTION INTRAVENOUS ONCE
Refills: 0 | Status: DISCONTINUED | OUTPATIENT
Start: 2020-07-21 | End: 2020-07-21

## 2020-07-21 RX ORDER — FAMOTIDINE 10 MG/ML
20 INJECTION INTRAVENOUS ONCE
Refills: 0 | Status: COMPLETED | OUTPATIENT
Start: 2020-07-21 | End: 2020-07-21

## 2020-07-21 RX ADMIN — Medication 30 MILLILITER(S): at 23:22

## 2020-07-21 RX ADMIN — FAMOTIDINE 20 MILLIGRAM(S): 10 INJECTION INTRAVENOUS at 23:22

## 2020-07-21 RX ADMIN — Medication 10 MILLIGRAM(S): at 23:22

## 2020-07-21 RX ADMIN — SODIUM CHLORIDE 1000 MILLILITER(S): 9 INJECTION, SOLUTION INTRAVENOUS at 23:22

## 2020-07-21 NOTE — ED PROVIDER NOTE - PRINCIPAL DIAGNOSIS
----- Message from Mary Ellen Bhandari NP sent at 12/4/2019  1:21 PM CST -----  Please call,positive for type one and type two. She had this done,originally through [facility],wanted it repeated here. Denies lesions   Chest pain

## 2020-07-21 NOTE — ED PROVIDER NOTE - OBJECTIVE STATEMENT
Stiven BHAT MD PGY3: 50 F no PMH here for 2 weeks of worsening epigastric discomfort and belching radiating to her chest and associated with some dysphagia but able to swallow. Had similar presentation last July where she was wokred up in ED with a CTAP to eval obstruction which was negative and follow-up with cards revealed a nromal stress. No assoc with exertion, no hx CAD. Felt uncomfortable and a little faint today and hence came here. No fevers, chills, nausea, vomiting. Only belching.

## 2020-07-21 NOTE — ED PROVIDER NOTE - PHYSICAL EXAMINATION
Stiven BHAT MD PGY3:   PHYSICAL EXAM:    GENERAL: NAD, well-developed  HEENT:  Atraumatic, Normocephalic  CHEST/LUNG: Chest rise equal bilaterally. CTAB.   HEART: Regular rate and rhythm. No murmurs or rubs.   ABDOMEN: Soft, Nontender, Nondistended. Normal bowel sounds.   EXTREMITIES:  2+ Peripheral Pulses.  PSYCH: A&Ox3  SKIN: No obvious rashes or lesions

## 2020-07-21 NOTE — ED PROVIDER NOTE - ATTENDING CONTRIBUTION TO CARE
Patient presenting complaining of epigastric dyscomfort radiating into chest.  Patient had similar symptoms one year ago, seen at [facility] with unremarkable imaging, followed up with GI and cardiology.  Cardiology cleared patient from cardiac etiology of symptoms.  Patient never followed up with GI as symptoms improved.  Did have recurrent more mild symptoms since that time that she never followed up.  Presenting tonight because of ongoing symptoms.  No therapies to date at home.   Has follow up with her GI as outpatient arranged for next week.  Denying associated fevers, chills, shortness of breath.    A 14 point review of systems is negative except as in HPI or otherwise documented.    Exam:  General: Patient well appearing, vital signs within normal limits  HEENT: airway patent with moist mucous membranes  Cardiac: RRR S1/S2 with strong peripheral pulses  Respiratory: lungs clear without respiratory distress  GI: abdomen soft, non tender, non distended  Neuro: no gross neurologic deficits  Skin: warm, well perfused  Psych: normal mood and affect    Patient with ongoing chronic epigastric pains of likely GI origin, given age labs/troponin/EKG obtained to rule out ACS which was negative, CXR ordered but patient declined, will discharge with outpatient GI follow up.

## 2020-07-21 NOTE — ED PROVIDER NOTE - NSFOLLOWUPINSTRUCTIONS_ED_ALL_ED_FT
Please take maalox and pepcid over the counter for your symptoms.  Please follow up with your GI for further care.  You can also call gastroenterology at [facility] for further care or for a second opinion.

## 2020-07-21 NOTE — ED PROVIDER NOTE - NSFOLLOWUPCLINICS_GEN_ALL_ED_FT
[facility]  Gastroenterology  [address] [place] [id number]  Phone: [phone]  Fax:   Follow Up Time: Routine

## 2020-07-21 NOTE — ED PROVIDER NOTE - CLINICAL SUMMARY MEDICAL DECISION MAKING FREE TEXT BOX
Stiven BHAT MD PGY3: 50 F here with nonexertional CP and belching c/f pancreatitits, ACS, gastritis. WIll obtain LFTs and lipase and troponin. EKG normal. Will give Gi cocktail, antiemetic, fluids and reassess. Benign abdominal exam.

## 2020-07-21 NOTE — ED PROVIDER NOTE - PATIENT PORTAL LINK FT
You can access the FollowMyHealth Patient Portal offered by [facility] by registering at the following website: [email or web address]. By joining [facility]’s FollowMyHealth portal, you will also be able to view your health information using other applications (apps) compatible with our system.

## 2020-07-21 NOTE — ED ADULT NURSE NOTE - OBJECTIVE STATEMENT
50 yr old female came in with 2 week of reflux type symptoms with some chest pressure and belching. has a gastro. tonight she was having same symptoms and her  wanted her to come in. on assessment a and o x 3 lungs clear abd soft slightly tender no swelling in extremities some nausea due to having aspirin on an empty stomach and a headache after receiving nitro via ems. no other complaints or issues

## 2020-07-22 VITALS
OXYGEN SATURATION: 97 % | DIASTOLIC BLOOD PRESSURE: 69 MMHG | HEART RATE: 72 BPM | TEMPERATURE: 99 F | SYSTOLIC BLOOD PRESSURE: 102 MMHG | RESPIRATION RATE: 17 BRPM

## 2020-07-22 PROCEDURE — 96375 TX/PRO/DX INJ NEW DRUG ADDON: CPT

## 2020-07-22 PROCEDURE — 93005 ELECTROCARDIOGRAM TRACING: CPT

## 2020-07-22 PROCEDURE — 80053 COMPREHEN METABOLIC PANEL: CPT

## 2020-07-22 PROCEDURE — 99284 EMERGENCY DEPT VISIT MOD MDM: CPT | Mod: 25

## 2020-07-22 PROCEDURE — 83690 ASSAY OF LIPASE: CPT

## 2020-07-22 PROCEDURE — 84484 ASSAY OF TROPONIN QUANT: CPT

## 2020-07-22 PROCEDURE — 96374 THER/PROPH/DIAG INJ IV PUSH: CPT

## 2020-07-22 PROCEDURE — 85027 COMPLETE CBC AUTOMATED: CPT

## 2020-07-22 RX ADMIN — SIMETHICONE 160 MILLIGRAM(S): 80 TABLET, CHEWABLE ORAL at 00:05

## 2020-07-22 NOTE — ED ADULT NURSE REASSESSMENT NOTE - NS ED NURSE REASSESS COMMENT FT1
Report received from Suraj MCCLAIN in pink @ 2300. Patient seen resting comfortably in bed, fluids infusing. Awaiting CXR and results. Safety and comfort provided.

## 2020-07-28 ENCOUNTER — RESULT REVIEW (OUTPATIENT)
Age: 50
End: 2020-07-28

## 2020-08-06 ENCOUNTER — APPOINTMENT (OUTPATIENT)
Dept: GASTROENTEROLOGY | Facility: CLINIC | Age: 50
End: 2020-08-06
Payer: COMMERCIAL

## 2020-08-06 VITALS
BODY MASS INDEX: 21.34 KG/M2 | HEIGHT: 61 IN | WEIGHT: 113 LBS | SYSTOLIC BLOOD PRESSURE: 133 MMHG | HEART RATE: 84 BPM | DIASTOLIC BLOOD PRESSURE: 79 MMHG

## 2020-08-06 DIAGNOSIS — Z83.79 FAMILY HISTORY OF OTHER DISEASES OF THE DIGESTIVE SYSTEM: ICD-10-CM

## 2020-08-06 PROCEDURE — 99204 OFFICE O/P NEW MOD 45 MIN: CPT

## 2020-08-06 RX ORDER — SIMETHICONE 125 MG/1
CAPSULE, LIQUID FILLED ORAL
Refills: 0 | Status: ACTIVE | COMMUNITY

## 2020-08-06 NOTE — PHYSICAL EXAM
[General Appearance - Alert] : alert [Sclera] : the sclera and conjunctiva were normal [Extraocular Movements] : extraocular movements were intact [Outer Ear] : the ears and nose were normal in appearance [Hearing Threshold Finger Rub Not [place]] : hearing was normal [Neck Appearance] : the appearance of the neck was normal [Neck Cervical Mass (___cm)] : no neck mass was observed [Heart Rate And Rhythm] : heart rate was normal and rhythm regular [Auscultation Breath Sounds / Voice Sounds] : lungs were clear to auscultation bilaterally [] : no respiratory distress [Edema] : there was no peripheral edema [Bowel Sounds] : normal bowel sounds [Heart Sounds] : normal S1 and S2 [Abdomen Hernia] : no hernia was discovered [Abdomen Soft] : soft [Abdomen Mass (___ Cm)] : no abdominal mass palpated [FreeTextEntry1] : Recently performed by Gyn [Supraclavicular Lymph Nodes Enlarged Bilaterally] : supraclavicular [Cervical Lymph Nodes Enlarged Anterior Bilaterally] : anterior cervical [Abnormal Walk] : normal gait [No CVA Tenderness] : no ~M costovertebral angle tenderness [Skin Color & Pigmentation] : normal skin color and pigmentation [Skin Turgor] : normal skin turgor [No Focal Deficits] : no focal deficits [Oriented To Time, Place, And Person] : oriented to person, place, and time [Impaired Insight] : insight and judgment were intact

## 2020-08-06 NOTE — HISTORY OF PRESENT ILLNESS
[Heartburn] : heartburn worsened [Nausea] : nausea worsened [Abdominal Pain] : abdominal pain worsened [Vomiting] : denies vomiting [Yellow Skin Or Eyes (Jaundice)] : denies jaundice [Abdominal Swelling] : abdominal swelling worsened [Diarrhea] : diarrhea [Constipation] : constipation [de-identified] : Bud presents to the office today for evaluation of upper GI symptoms.  \par \par She reports that she started to have nonradiating epigastric pain, excessive burping, chest discomfort, and a hoarse voice since the end of June 2020.  She also notes mild dysphagia localized to the epigastric region and nausea, but denies vomiting.  The symptoms occur all day long, but is worse after eating.  She has not been able to eat due to the symptoms and has lost about 10 pounds in the past 6 weeks.  She has not been able to function and feels as if she may pass out.  She went to the [facility] ER on July 21.  Her CBC, CMP, and troponin were unremarkable and she felt better after taking Pepcid, Mylanta, and Reglan.  She has been taking Nexium 20 mg over the past 2 weeks along with  simethicone and Mylanta but does not feel that she is getting better.  She had an abdominal US on July 20 which was unremarkable.  She reports that she has had increased stress recently and wonders if that could be contributing.  She also notes that she breathes through her mouth when she wears a mask.  She had similar symptoms in July 2019 when she was under a great deal of stress.  At that time, a CT A/P in the ER was unremarkable.  She saw Dr. Mike Silva (GI) who performed a lactose breath test which was negative.  He advised her to come back after a cardiac evaluation and she reports that stress testing was negative that summer.  However, once symptoms improved, she did not follow up.\par \par The patient also reports some lower abdominal cramping which is worse around the time of her menses.  She normally moves her bowels daily but since the upper GI symptoms started, her bowel habits have been irregular with constipation (1 BM every 2-3 days) or loose stools.  She saw her Gyn doctor recently and reports that some trace pelvic fluid was seen on a pelvic US.  Trace fluid and a luteal cyst was seen in her CT in July 2019.  She has never had a colonoscopy. [_________] : Performed [unfilled] [de-identified] : unremarkable [de-identified] : no bowel inflammation/ obstruction

## 2020-08-06 NOTE — ASSESSMENT
[FreeTextEntry1] : 1.  Epigastric pain, bloating, eructation, nausea.  Likely GERD with esophagitis.  Differential includes symptomatic hiatus hernia, ulcer, gastritis, aerophagia, dyspepsia, motility disorder.  Abdominal US in July 2020 unremarkable.  CT A/P in July 2019 negative for GI pathology.\par 2.  Irregular bowel habits, lower abdominal cramping.  May be secondary to medications, ruptured ovarian cyst, IBS.  No prior colonoscopy.\par 3.  Left hip fracture status post ORIF.\par \par Recs:\par - Recent labs, ER records, CT, and ultrasound reviewed.\par - Patient was counseled on GERD lifestyle modifications including head of bed elevation at night, avoiding lying down 2-3 hours after eating, avoiding tight-fitting clothing, eating small, frequent meals, and minimizing potential food triggers (caffeine, spicy foods, citrus foods, chocolate, mint, alcohol).\par - Eat slower meals, attempt not to breathe through mouth and swallow air, avoid gum-chewing and carbonated beverages.  The patient was given a FODMAP diet to advise on foods which may precipitate bloating and gas pains.\par - Use Nexium or Dexilant for next 1-2 months.  Can also use famotidine, simethicone, and Reglan as needed.\par - Patient was offered EGD and screening colonoscopy.  However, she does not feel that she is physically able to perform procedures in current state.\par - Follow up in 2 months.

## 2020-08-06 NOTE — CONSULT LETTER
[Dear  ___] : Dear  [unfilled], [Consult Letter:] : I had the pleasure of evaluating your patient, [unfilled]. [Please see my note below.] : Please see my note below. [Consult Closing:] : Thank you very much for allowing me to participate in the care of this patient.  If you have any questions, please do not hesitate to contact me. [Sincerely,] : Sincerely, [FreeTextEntry3] : Ish Peck MD\par Department of Gastroenterology\par [facility]\par [address] [place] [address], [facility] [address]\[place] [address]\par Tel: [phone]\par Email: [email or web address]

## 2020-08-06 NOTE — REVIEW OF SYSTEMS
[Feeling Poorly] : feeling poorly [Feeling Tired] : feeling tired [Recent Weight Loss (___ Lbs)] : recent [unfilled] ~Ulb weight loss [Palpitations] : palpitations [Chest Pain] : chest pain [Leg Claudication] : intermittent leg claudication [Lower Ext Edema] : lower extremity edema [Deepening Of The Voice] : deepening of the voice [Negative] : Heme/Lymph [As Noted in HPI] : as noted in HPI

## 2020-08-19 ENCOUNTER — APPOINTMENT (OUTPATIENT)
Dept: GASTROENTEROLOGY | Facility: CLINIC | Age: 50
End: 2020-08-19
Payer: COMMERCIAL

## 2020-08-19 VITALS
WEIGHT: 115 LBS | TEMPERATURE: 96.9 F | HEIGHT: 61 IN | HEART RATE: 75 BPM | BODY MASS INDEX: 21.71 KG/M2 | SYSTOLIC BLOOD PRESSURE: 119 MMHG | DIASTOLIC BLOOD PRESSURE: 75 MMHG

## 2020-08-19 DIAGNOSIS — K14.3 HYPERTROPHY OF TONGUE PAPILLAE: ICD-10-CM

## 2020-08-19 DIAGNOSIS — R53.1 WEAKNESS: ICD-10-CM

## 2020-08-19 DIAGNOSIS — R11.0 NAUSEA: ICD-10-CM

## 2020-08-19 DIAGNOSIS — R19.5 OTHER FECAL ABNORMALITIES: ICD-10-CM

## 2020-08-19 PROCEDURE — 99214 OFFICE O/P EST MOD 30 MIN: CPT

## 2020-08-19 PROCEDURE — 82274 ASSAY TEST FOR BLOOD FECAL: CPT | Mod: QW

## 2020-08-19 RX ORDER — METOCLOPRAMIDE 5 MG/1
5 TABLET ORAL 3 TIMES DAILY
Qty: 30 | Refills: 0 | Status: DISCONTINUED | COMMUNITY
Start: 2020-08-06 | End: 2020-08-19

## 2020-08-19 RX ORDER — DEXLANSOPRAZOLE 30 MG/1
30 CAPSULE, DELAYED RELEASE ORAL DAILY
Qty: 30 | Refills: 3 | Status: DISCONTINUED | COMMUNITY
Start: 2020-08-06 | End: 2020-08-19

## 2020-08-19 NOTE — REVIEW OF SYSTEMS
[Feeling Poorly] : feeling poorly [Feeling Tired] : feeling tired [Recent Weight Loss (___ Lbs)] : recent [unfilled] ~Ulb weight loss [Dry Eyes] : dryness of the eyes [Chest Pain] : chest pain [Palpitations] : palpitations [Lower Ext Edema] : lower extremity edema [Abdominal Pain] : abdominal pain [Constipation] : constipation [Diarrhea] : diarrhea [Heartburn] : heartburn [Pelvic Pain] : pelvic pain [Dysmenorrhea] : dysmenorrhea [Joint Pain] : joint pain [Dizziness] : dizziness [Fainting] : fainting [Negative] : Heme/Lymph

## 2020-08-19 NOTE — ASSESSMENT
[FreeTextEntry1] : 1.  GERD/dyspepsia/chest discomfort-rule out esophagitis, gastritis, H. pylori, peptic ulcer, functional symptoms.\par 2.  Loose stool-may be secondary to Nexium, rule out irritable bowel syndrome, doubt colonic lesion.\par 3.  Status post left hip fracture-status post ORIF.\par \par Plan:\par 1.  The patient was advised to schedule an upper endoscopy and a colonoscopy.  Both procedures, material risks, benefits and alternatives were discussed with her at length.  Brochures given.  MiraLAX prep.  The patient was told that even if she did not have any GI symptoms at this time, I would still recommend that she have a colonoscopy as a screening procedure.\par 2.  Obtain blood test results from the patient's primary care physician.

## 2020-08-19 NOTE — REASON FOR VISIT
[Follow-Up: _____] : a [unfilled] follow-up visit [FreeTextEntry1] : Burning sensation in mouth and tongue, watery yellow stool, dizziness, fatigue

## 2020-08-19 NOTE — PHYSICAL EXAM
[General Appearance - Alert] : alert [General Appearance - In No Acute Distress] : in no acute distress [General Appearance - Well Developed] : well developed [General Appearance - Well Nourished] : well nourished [General Appearance - Well-Appearing] : healthy appearing [Sclera] : the sclera and conjunctiva were normal [PERRL With Normal Accommodation] : pupils were equal in size, round, and reactive to light [Strabismus] : no strabismus was seen [Extraocular Movements] : extraocular movements were intact [Outer Ear] : the ears and nose were normal in appearance [Examination Of The Oral Cavity] : the lips and gums were normal [Nasal Cavity] : the nasal mucosa and septum were normal [Oropharynx] : the oropharynx was normal [Neck Appearance] : the appearance of the neck was normal [Neck Cervical Mass (___cm)] : no neck mass was observed [Jugular Venous Distention Increased] : there was no jugular-venous distention [Thyroid Diffuse Enlargement] : the thyroid was not enlarged [Thyroid Nodule] : there were no palpable thyroid nodules [Auscultation Breath Sounds / Voice Sounds] : lungs were clear to auscultation bilaterally [Lungs Percussion] : the lungs were normal to percussion [Heart Rate And Rhythm] : heart rate was normal and rhythm regular [Heart Sounds Gallop] : no gallops [Heart Sounds] : normal S1 and S2 [Heart Sounds Pericardial Friction Rub] : no pericardial rub [Murmurs] : no murmurs [Arterial Pulses Carotid] : carotid pulses were normal with no bruits [Abdominal Aorta] : the abdominal aorta was normal [Full Pulse] : the pedal pulses are present [Edema] : there was no peripheral edema [Abdomen Tenderness] : non-tender [Bowel Sounds] : normal bowel sounds [Abdomen Soft] : soft [Abdomen Mass (___ Cm)] : no abdominal mass palpated [Abdomen Hernia] : no hernia was discovered [Normal Sphincter Tone] : normal sphincter tone [No Rectal Mass] : no rectal mass [Occult Blood Positive] : stool was negative for occult blood [FreeTextEntry1] : Stool yellow, negative Hemoccult, negative iFOBT [Cervical Lymph Nodes Enlarged Anterior Bilaterally] : anterior cervical [Cervical Lymph Nodes Enlarged Posterior Bilaterally] : posterior cervical [Supraclavicular Lymph Nodes Enlarged Bilaterally] : supraclavicular [Axillary Lymph Nodes Enlarged Bilaterally] : axillary [Femoral Lymph Nodes Enlarged Bilaterally] : femoral [No CVA Tenderness] : no ~M costovertebral angle tenderness [Inguinal Lymph Nodes Enlarged Bilaterally] : inguinal [Abnormal Walk] : normal gait [Nail Clubbing] : no clubbing  or cyanosis of the fingernails [No Spinal Tenderness] : no spinal tenderness [Involuntary Movements] : no involuntary movements were seen [Musculoskeletal - Swelling] : no joint swelling seen [Motor Tone] : muscle strength and tone were normal [Skin Turgor] : normal skin turgor [Skin Color & Pigmentation] : normal skin color and pigmentation [] : no rash [Skin Lesions] : no skin lesions [Oriented To Time, Place, And Person] : oriented to person, place, and time [No Focal Deficits] : no focal deficits [Mood] : the mood was normal [Impaired Insight] : insight and judgment were intact [Affect] : the affect was normal

## 2020-08-19 NOTE — HISTORY OF PRESENT ILLNESS
[FreeTextEntry1] : Bud continues to have burning in her throat, sour taste, nausea, white coated tongue, burping and belching despite increasing the Nexium to 40 mg in the morning and adding Pepcid 40 mg in the evening.  She is also developed frequent yellow loose stools.  She feels like she is going to pass out.  She has chest discomfort and feels like food is sticking in her chest.  She also complains of lower abdominal pains.  She has very similar symptoms in July 2019 for 2 months.  At that time, an upper endoscopy and colonoscopy were recommended, but the patient did not undergo these procedures.  She then felt better until a few months ago.  She admits to being very anxious.  She has generalized weakness.

## 2020-08-19 NOTE — CONSULT LETTER
[Consult Letter:] : I had the pleasure of evaluating your patient, [unfilled]. [Dear  ___] : Dear  [unfilled], [Consult Closing:] : Thank you very much for allowing me to participate in the care of this patient.  If you have any questions, please do not hesitate to contact me. [( Thank you for referring [unfilled] for consultation for _____ )] : Thank you for referring [unfilled] for consultation for [unfilled] [Please see my note below.] : Please see my note below. [Sincerely,] : Sincerely, [FreeTextEntry3] : Ezequiel Patel MD

## 2020-09-04 ENCOUNTER — APPOINTMENT (OUTPATIENT)
Dept: DISASTER EMERGENCY | Facility: CLINIC | Age: 50
End: 2020-09-04

## 2020-09-05 LAB — SARS-COV-2 N GENE NPH QL NAA+PROBE: NOT DETECTED

## 2020-09-07 ENCOUNTER — LABORATORY RESULT (OUTPATIENT)
Age: 50
End: 2020-09-07

## 2020-09-08 ENCOUNTER — APPOINTMENT (OUTPATIENT)
Dept: GASTROENTEROLOGY | Facility: CLINIC | Age: 50
End: 2020-09-08
Payer: COMMERCIAL

## 2020-09-08 PROCEDURE — 84703 CHORIONIC GONADOTROPIN ASSAY: CPT | Mod: 59,QW

## 2020-09-08 PROCEDURE — 43239 EGD BIOPSY SINGLE/MULTIPLE: CPT

## 2020-09-11 ENCOUNTER — NON-APPOINTMENT (OUTPATIENT)
Age: 50
End: 2020-09-11

## 2020-09-30 ENCOUNTER — APPOINTMENT (OUTPATIENT)
Dept: GASTROENTEROLOGY | Facility: CLINIC | Age: 50
End: 2020-09-30
Payer: COMMERCIAL

## 2020-09-30 VITALS
WEIGHT: 116 LBS | HEIGHT: 61 IN | BODY MASS INDEX: 21.9 KG/M2 | HEART RATE: 71 BPM | SYSTOLIC BLOOD PRESSURE: 138 MMHG | DIASTOLIC BLOOD PRESSURE: 78 MMHG | TEMPERATURE: 97.1 F

## 2020-09-30 PROCEDURE — 99214 OFFICE O/P EST MOD 30 MIN: CPT

## 2020-09-30 RX ORDER — ESOMEPRAZOLE MAGNESIUM 20 MG/1
20 CAPSULE, DELAYED RELEASE ORAL
Qty: 60 | Refills: 3 | Status: DISCONTINUED | COMMUNITY
End: 2020-09-30

## 2020-09-30 NOTE — REVIEW OF SYSTEMS
[Chest Pain] : chest pain [Palpitations] : palpitations [Leg Claudication] : intermittent leg claudication [Lower Ext Edema] : lower extremity edema [Abdominal Pain] : abdominal pain [Constipation] : constipation [Heartburn] : heartburn [Dysmenorrhea] : dysmenorrhea [Arthralgias] : arthralgias [Joint Pain] : joint pain [Limb Swelling] : limb swelling [Negative] : Heme/Lymph

## 2020-09-30 NOTE — PHYSICAL EXAM
[General Appearance - Alert] : alert [Hearing Threshold Finger Rub Not [place]] : hearing was normal [Outer Ear] : the ears and nose were normal in appearance [Sclera] : the sclera and conjunctiva were normal [Extraocular Movements] : extraocular movements were intact [Neck Appearance] : the appearance of the neck was normal [Auscultation Breath Sounds / Voice Sounds] : lungs were clear to auscultation bilaterally [] : no respiratory distress [Heart Rate And Rhythm] : heart rate was normal and rhythm regular [Heart Sounds] : normal S1 and S2 [Edema] : there was no peripheral edema [Abdomen Mass (___ Cm)] : no abdominal mass palpated [Bowel Sounds] : normal bowel sounds [Abdomen Soft] : soft [Abdomen Hernia] : no hernia was discovered [No CVA Tenderness] : no ~M costovertebral angle tenderness [Supraclavicular Lymph Nodes Enlarged Bilaterally] : supraclavicular [Cervical Lymph Nodes Enlarged Anterior Bilaterally] : anterior cervical [Abnormal Walk] : normal gait [Skin Color & Pigmentation] : normal skin color and pigmentation [Skin Turgor] : normal skin turgor [No Focal Deficits] : no focal deficits [Oriented To Time, Place, And Person] : oriented to person, place, and time [FreeTextEntry1] : anxious

## 2020-09-30 NOTE — HISTORY OF PRESENT ILLNESS
[Heartburn] : heartburn worsened [Yellow Skin Or Eyes (Jaundice)] : denies jaundice [Nausea] : nausea worsened [Vomiting] : denies vomiting [Abdominal Swelling] : abdominal swelling worsened [Abdominal Pain] : abdominal pain worsened [Diarrhea] : diarrhea [Constipation] : constipation [_________] : Performed [unfilled] [de-identified] : GERD, H.pylori gastritis [de-identified] : Bud presents to the office today for follow up after having an EGD with Dr. Patel 3 weeks ago.\par \par Her upper endoscopy was grossly normal, but biopsies revealed mild gastroesophageal reflux and H.pylori gastritis.  After the EGD, the patient was extremely weak and needed a wheelchair to go to her car.\par \par She is currently taking omeprazole 40 mg in the AM and famotidine in the evening. While she feels slightly better and is trying to eat more, she continues to have epigastric pain, excessive burping, and a sour taste in her mouth.  She also has intermittent loose stools which are yellow; other times she can be constipated.\par \par PMH: The patient has a history of anxiety with generalized weakness. She went to the [facility] ER in July 2020 for epigastric pain and weakness.  Her CBC, CMP, and troponin were unremarkable and she felt better after taking Pepcid, Mylanta, and Reglan.  She had an abdominal US on July 20 which was unremarkable.  She had similar symptoms in July 2019 when she was under a great deal of stress.  At that time, a CT A/P in the ER was unremarkable.  She saw Dr. Mike Silva (GI) who performed a lactose breath test which was negative.  He advised her to come back after a cardiac evaluation and she reports that stress testing was negative that summer.  She has never had a colonoscopy. [de-identified] : no bowel inflammation/ obstruction [de-identified] : unremarkable

## 2020-11-11 RX ORDER — CLARITHROMYCIN 500 MG/1
500 TABLET, FILM COATED ORAL TWICE DAILY
Qty: 28 | Refills: 0 | Status: DISCONTINUED | COMMUNITY
Start: 2020-09-30 | End: 2020-11-11

## 2020-11-25 NOTE — ED ADULT TRIAGE NOTE - MODE OF ARRIVAL
"Pt removed IV, got dressed and stated "I have to go". Pt advised to stay for results to come back and for MD to speak to patient regarding leaving AMA. Pt left room without signing AMA form. Charge nurse notified of elopement   " EMS

## 2020-12-31 NOTE — ASSESSMENT
[FreeTextEntry1] : 1.  Epigastric pain, bloating, eructation, nausea.  EGD in September 2020 with GERD and H.pylori gastritis.  Functional dyspepsia also contributory.  Abdominal US in July 2020 unremarkable.  CT A/P in July 2019 negative for GI pathology.\par 2.  Irregular bowel habits, lower abdominal cramping.  Likely IBS- mixed type, may be secondary to medications.  No prior colonoscopy.\par 3.  Left hip fracture status post ORIF.\par 4.  Anxiety.\par 5.  ? PCN allergy.\par \par Recs:\par - EGD results reviewed.  Biopsy report and prior CT reported given to patient.\par - H.pylori gastritis seen on the upper endoscopy.  The patient was extensively counseled on potential complications of H.pylori including inflammation, ulcers, and stomach cancer.  Treatment regimens were also discussed in detail.  The patient has a listed PCN allergy, but she is unsure if it is a true allergy as she also describes cold urticaria.  The patient also reports gastric side effects with clarithromycin and metronidazole.  She would prefer to see an allergist and get tesing for PCN allergy before starting antibiotic treatment.  Triple therapy was prescribed at patient's request, but she will only consider it after seeing an allergist.\par - Continue PPI and famotidine in the interim.\par - Perform H.pylori stool antigen testing off PPI 2 months after completing antibiotic therapy.\par - Patient is not interested in performing colonoscopy at this time as she does not feel she can tolerate a prep and had extreme weakness after her EGD procedure. Symptom management, safety planning, care coordination  CGI <3 Symptom management, safety planning, care coordination  CGI <3 Symptom management, safety planning, care coordination  CGI <3 Symptom management, safety planning, care coordination  CGI <3 Symptom management, safety planning, care coordination  CGI <3 Symptom management, safety planning, care coordination  CGI <3 Symptom management, safety planning, care coordination  CGI <3 Symptom management, safety planning, care coordination  CGI <3 Symptom management, safety planning, care coordination  CGI <3 Symptom management, safety planning, care coordination  CGI <3 Symptom management, safety planning, care coordination  CGI <3 Symptom management, safety planning, care coordination  CGI <3 Symptom management, safety planning, care coordination  CGI <3 Symptom management, safety planning, care coordination  CGI <3 Symptom management, safety planning, care coordination  CGI <3 Symptom management, safety planning, care coordination  CGI <3

## 2021-01-11 LAB — H PYLORI AG STL QL: NOT DETECTED

## 2021-01-25 ENCOUNTER — APPOINTMENT (OUTPATIENT)
Dept: GASTROENTEROLOGY | Facility: CLINIC | Age: 51
End: 2021-01-25
Payer: COMMERCIAL

## 2021-01-25 ENCOUNTER — NON-APPOINTMENT (OUTPATIENT)
Age: 51
End: 2021-01-25

## 2021-01-25 VITALS
WEIGHT: 118 LBS | HEIGHT: 61 IN | HEART RATE: 79 BPM | DIASTOLIC BLOOD PRESSURE: 75 MMHG | TEMPERATURE: 95.9 F | BODY MASS INDEX: 22.28 KG/M2 | SYSTOLIC BLOOD PRESSURE: 128 MMHG

## 2021-01-25 DIAGNOSIS — R07.89 OTHER CHEST PAIN: ICD-10-CM

## 2021-01-25 DIAGNOSIS — R10.13 EPIGASTRIC PAIN: ICD-10-CM

## 2021-01-25 PROCEDURE — 99072 ADDL SUPL MATRL&STAF TM PHE: CPT

## 2021-01-25 PROCEDURE — 99214 OFFICE O/P EST MOD 30 MIN: CPT

## 2021-01-25 RX ORDER — FAMOTIDINE 20 MG/1
20 TABLET, FILM COATED ORAL
Qty: 60 | Refills: 3 | Status: ACTIVE | COMMUNITY
Start: 2020-08-06 | End: 1900-01-01

## 2021-01-25 RX ORDER — OMEPRAZOLE 40 MG/1
40 CAPSULE, DELAYED RELEASE ORAL
Refills: 0 | Status: DISCONTINUED | COMMUNITY
End: 2021-01-25

## 2021-01-25 NOTE — REVIEW OF SYSTEMS
[Feeling Poorly] : feeling poorly [Feeling Tired] : feeling tired [Palpitations] : palpitations [Abdominal Pain] : abdominal pain [Heartburn] : heartburn [Limb Weakness] : limb weakness [Difficulty Walking] : difficulty walking [Anxiety] : anxiety [Emotional Problems] : emotional problems [Feelings Of Weakness] : feelings of weakness [Negative] : Heme/Lymph

## 2021-01-25 NOTE — PHYSICAL EXAM
[General Appearance - Alert] : alert [Sclera] : the sclera and conjunctiva were normal [Extraocular Movements] : extraocular movements were intact [Outer Ear] : the ears and nose were normal in appearance [Hearing Threshold Finger Rub Not [place]] : hearing was normal [Neck Appearance] : the appearance of the neck was normal [] : no respiratory distress [Auscultation Breath Sounds / Voice Sounds] : lungs were clear to auscultation bilaterally [Heart Rate And Rhythm] : heart rate was normal and rhythm regular [Heart Sounds] : normal S1 and S2 [Edema] : there was no peripheral edema [Bowel Sounds] : normal bowel sounds [Abdomen Soft] : soft [Abdomen Mass (___ Cm)] : no abdominal mass palpated [Abdomen Hernia] : no hernia was discovered [Cervical Lymph Nodes Enlarged Anterior Bilaterally] : anterior cervical [Supraclavicular Lymph Nodes Enlarged Bilaterally] : supraclavicular [No CVA Tenderness] : no ~M costovertebral angle tenderness [Abnormal Walk] : normal gait [Skin Color & Pigmentation] : normal skin color and pigmentation [Skin Turgor] : normal skin turgor [No Focal Deficits] : no focal deficits [Oriented To Time, Place, And Person] : oriented to person, place, and time [FreeTextEntry1] : anxious

## 2021-01-25 NOTE — ASSESSMENT
[FreeTextEntry1] : 1.  Epigastric pain, bloating, eructation, nausea.  EGD in September 2020 with GERD and H.pylori gastritis status post eradication.  Functional dyspepsia/ heartburn also contributory.  Abdominal US in July 2020 unremarkable.  CT A/P in July 2019 negative for GI pathology.\par 2.  Irregular bowel habits, lower abdominal cramping.  Likely IBS- mixed type, may be secondary to medications.  No prior colonoscopy.\par 3.  Left hip fracture status post ORIF.\par 4.  Anxiety.\par 5.  ? PCN allergy.\par \par Recs:\par - H.pylori stool antigen reviewed.\par - Continue PPI in AM, consider BID dosing.\par - Can also use sucralfate 1-2 times a day PRN.\par - Patient was advised that further treatments could include baclofen to reduce TLESR and amitriptyline for functional heartburn symptoms.  Potential side effects including somnolence discussed. \par - Patient is not interested in performing colonoscopy at this time as she does not feel she can tolerate a prep and had extreme weakness after her EGD procedure.

## 2021-01-25 NOTE — HISTORY OF PRESENT ILLNESS
[Vomiting] : denies vomiting [Yellow Skin Or Eyes (Jaundice)] : denies jaundice [Diarrhea] : diarrhea [Constipation] : constipation [_________] : Performed [unfilled] [Heartburn] : stable heartburn [Nausea] : stable nausea [Abdominal Pain] : stable abdominal pain [Abdominal Swelling] : abdominal swelling stable [de-identified] : Bud presents to the office today for follow up of upper GI symptoms.  She was last seen in the office in September 2020.\par \par After her last visit, she completed triple therapy with levofloxacin and amoxicillin due to adverse effects with clarithromycin.  A stool antigen test was negative a few weeks ago.\par \par Despite eradication, she continues to have a sour taste in her mouth, frequent eructation, and epigastric discomfort.  When she flosses, even the floss tastes sour to her.  She restarted omeprazole and started taking famotidine at bedtime as well but does not feel it is helping.  Some days are better than others.  She has not noted any differences in her diet on the days she feels worse.  She continues to suffer from anxiety.\par \par PMH: The patient has a history of anxiety with generalized weakness. She went to the [facility] ER in July 2020 for epigastric pain and weakness.  Her CBC, CMP, and troponin were unremarkable and she felt better after taking Pepcid, Mylanta, and Reglan.  She had an abdominal US on July 20 which was unremarkable.  She had similar symptoms in July 2019 when she was under a great deal of stress.  At that time, a CT A/P in the ER was unremarkable.  She saw Dr. Mike Silva (GI) who performed a lactose breath test which was negative.  He advised her to come back after a cardiac evaluation and she reports that stress testing was negative that summer.  She has never had a colonoscopy.  Her upper endoscopy in September 2020 was grossly normal, but biopsies revealed mild gastroesophageal reflux and H.pylori gastritis.  After the EGD, the patient was extremely weak and needed a wheelchair to go to her car. [de-identified] : no bowel inflammation/ obstruction [de-identified] : GERD, H.pylori gastritis [de-identified] : unremarkable

## 2021-01-27 ENCOUNTER — TRANSCRIPTION ENCOUNTER (OUTPATIENT)
Age: 51
End: 2021-01-27

## 2021-03-30 ENCOUNTER — RESULT REVIEW (OUTPATIENT)
Age: 51
End: 2021-03-30

## 2021-06-02 ENCOUNTER — EMERGENCY (EMERGENCY)
Facility: HOSPITAL | Age: 51
LOS: 1 days | Discharge: HOME | End: 2021-06-02
Attending: EMERGENCY MEDICINE
Payer: MEDICAID

## 2021-06-02 VITALS
TEMPERATURE: 98 F | DIASTOLIC BLOOD PRESSURE: 86 MMHG | RESPIRATION RATE: 17 BRPM | OXYGEN SATURATION: 100 % | HEART RATE: 67 BPM | SYSTOLIC BLOOD PRESSURE: 135 MMHG

## 2021-06-02 VITALS
RESPIRATION RATE: 16 BRPM | OXYGEN SATURATION: 100 % | DIASTOLIC BLOOD PRESSURE: 84 MMHG | HEART RATE: 73 BPM | HEIGHT: 61 IN | TEMPERATURE: 98 F | SYSTOLIC BLOOD PRESSURE: 133 MMHG | WEIGHT: 110.01 LBS

## 2021-06-02 LAB
ALBUMIN SERPL ELPH-MCNC: 4.3 G/DL — SIGNIFICANT CHANGE UP (ref 3.3–5)
ALP SERPL-CCNC: 57 U/L — SIGNIFICANT CHANGE UP (ref 40–120)
ALT FLD-CCNC: 40 U/L — SIGNIFICANT CHANGE UP (ref 10–45)
ANION GAP SERPL CALC-SCNC: 12 MMOL/L — SIGNIFICANT CHANGE UP (ref 5–17)
APPEARANCE UR: CLEAR — SIGNIFICANT CHANGE UP
AST SERPL-CCNC: 33 U/L — SIGNIFICANT CHANGE UP (ref 10–40)
BACTERIA # UR AUTO: NEGATIVE — SIGNIFICANT CHANGE UP
BASE EXCESS BLDV CALC-SCNC: 1.5 MMOL/L — SIGNIFICANT CHANGE UP (ref -2–2)
BASOPHILS # BLD AUTO: 0.03 K/UL — SIGNIFICANT CHANGE UP (ref 0–0.2)
BASOPHILS NFR BLD AUTO: 0.7 % — SIGNIFICANT CHANGE UP (ref 0–2)
BILIRUB SERPL-MCNC: 0.5 MG/DL — SIGNIFICANT CHANGE UP (ref 0.2–1.2)
BILIRUB UR-MCNC: NEGATIVE — SIGNIFICANT CHANGE UP
BUN SERPL-MCNC: 11 MG/DL — SIGNIFICANT CHANGE UP (ref 7–23)
CA-I SERPL-SCNC: 1.19 MMOL/L — SIGNIFICANT CHANGE UP (ref 1.12–1.3)
CALCIUM SERPL-MCNC: 8.9 MG/DL — SIGNIFICANT CHANGE UP (ref 8.4–10.5)
CHLORIDE BLDV-SCNC: 112 MMOL/L — HIGH (ref 96–108)
CHLORIDE SERPL-SCNC: 106 MMOL/L — SIGNIFICANT CHANGE UP (ref 96–108)
CO2 BLDV-SCNC: 28 MMOL/L — SIGNIFICANT CHANGE UP (ref 22–30)
CO2 SERPL-SCNC: 23 MMOL/L — SIGNIFICANT CHANGE UP (ref 22–31)
COLOR SPEC: COLORLESS — SIGNIFICANT CHANGE UP
CREAT SERPL-MCNC: 0.6 MG/DL — SIGNIFICANT CHANGE UP (ref 0.5–1.3)
DIFF PNL FLD: ABNORMAL
EOSINOPHIL # BLD AUTO: 0.04 K/UL — SIGNIFICANT CHANGE UP (ref 0–0.5)
EOSINOPHIL NFR BLD AUTO: 0.9 % — SIGNIFICANT CHANGE UP (ref 0–6)
EPI CELLS # UR: 1 /HPF — SIGNIFICANT CHANGE UP
GAS PNL BLDV: 139 MMOL/L — SIGNIFICANT CHANGE UP (ref 135–145)
GAS PNL BLDV: SIGNIFICANT CHANGE UP
GLUCOSE BLDV-MCNC: 106 MG/DL — HIGH (ref 70–99)
GLUCOSE SERPL-MCNC: 102 MG/DL — HIGH (ref 70–99)
GLUCOSE UR QL: NEGATIVE — SIGNIFICANT CHANGE UP
HCO3 BLDV-SCNC: 27 MMOL/L — SIGNIFICANT CHANGE UP (ref 21–29)
HCT VFR BLD CALC: 38.3 % — SIGNIFICANT CHANGE UP (ref 34.5–45)
HCT VFR BLDA CALC: 39 % — SIGNIFICANT CHANGE UP (ref 39–50)
HGB BLD CALC-MCNC: 12.6 G/DL — SIGNIFICANT CHANGE UP (ref 11.5–15.5)
HGB BLD-MCNC: 12.1 G/DL — SIGNIFICANT CHANGE UP (ref 11.5–15.5)
HOROWITZ INDEX BLDV+IHG-RTO: SIGNIFICANT CHANGE UP
HYALINE CASTS # UR AUTO: 0 /LPF — SIGNIFICANT CHANGE UP (ref 0–2)
IMM GRANULOCYTES NFR BLD AUTO: 0.2 % — SIGNIFICANT CHANGE UP (ref 0–1.5)
KETONES UR-MCNC: NEGATIVE — SIGNIFICANT CHANGE UP
LACTATE BLDV-MCNC: 0.9 MMOL/L — SIGNIFICANT CHANGE UP (ref 0.7–2)
LEUKOCYTE ESTERASE UR-ACNC: NEGATIVE — SIGNIFICANT CHANGE UP
LYMPHOCYTES # BLD AUTO: 1.7 K/UL — SIGNIFICANT CHANGE UP (ref 1–3.3)
LYMPHOCYTES # BLD AUTO: 39.3 % — SIGNIFICANT CHANGE UP (ref 13–44)
MAGNESIUM SERPL-MCNC: 2.1 MG/DL — SIGNIFICANT CHANGE UP (ref 1.6–2.6)
MCHC RBC-ENTMCNC: 30.7 PG — SIGNIFICANT CHANGE UP (ref 27–34)
MCHC RBC-ENTMCNC: 31.6 GM/DL — LOW (ref 32–36)
MCV RBC AUTO: 97.2 FL — SIGNIFICANT CHANGE UP (ref 80–100)
MONOCYTES # BLD AUTO: 0.4 K/UL — SIGNIFICANT CHANGE UP (ref 0–0.9)
MONOCYTES NFR BLD AUTO: 9.2 % — SIGNIFICANT CHANGE UP (ref 2–14)
NEUTROPHILS # BLD AUTO: 2.15 K/UL — SIGNIFICANT CHANGE UP (ref 1.8–7.4)
NEUTROPHILS NFR BLD AUTO: 49.7 % — SIGNIFICANT CHANGE UP (ref 43–77)
NITRITE UR-MCNC: NEGATIVE — SIGNIFICANT CHANGE UP
NRBC # BLD: 0 /100 WBCS — SIGNIFICANT CHANGE UP (ref 0–0)
PCO2 BLDV: 48 MMHG — SIGNIFICANT CHANGE UP (ref 35–50)
PH BLDV: 7.37 — SIGNIFICANT CHANGE UP (ref 7.35–7.45)
PH UR: 8 — SIGNIFICANT CHANGE UP (ref 5–8)
PLATELET # BLD AUTO: 211 K/UL — SIGNIFICANT CHANGE UP (ref 150–400)
PO2 BLDV: 32 MMHG — SIGNIFICANT CHANGE UP (ref 25–45)
POTASSIUM BLDV-SCNC: 3.8 MMOL/L — SIGNIFICANT CHANGE UP (ref 3.5–5.3)
POTASSIUM SERPL-MCNC: 3.7 MMOL/L — SIGNIFICANT CHANGE UP (ref 3.5–5.3)
POTASSIUM SERPL-SCNC: 3.7 MMOL/L — SIGNIFICANT CHANGE UP (ref 3.5–5.3)
PROT SERPL-MCNC: 7.1 G/DL — SIGNIFICANT CHANGE UP (ref 6–8.3)
PROT UR-MCNC: NEGATIVE — SIGNIFICANT CHANGE UP
RBC # BLD: 3.94 M/UL — SIGNIFICANT CHANGE UP (ref 3.8–5.2)
RBC # FLD: 11.9 % — SIGNIFICANT CHANGE UP (ref 10.3–14.5)
RBC CASTS # UR COMP ASSIST: 0 /HPF — SIGNIFICANT CHANGE UP (ref 0–4)
SAO2 % BLDV: 60 % — LOW (ref 67–88)
SODIUM SERPL-SCNC: 141 MMOL/L — SIGNIFICANT CHANGE UP (ref 135–145)
SP GR SPEC: 1.01 — LOW (ref 1.01–1.02)
T3 SERPL-MCNC: 112 NG/DL — SIGNIFICANT CHANGE UP (ref 80–200)
T4 AB SER-ACNC: 5.9 UG/DL — SIGNIFICANT CHANGE UP (ref 4.6–12)
TROPONIN T, HIGH SENSITIVITY RESULT: <6 NG/L — SIGNIFICANT CHANGE UP (ref 0–51)
TSH SERPL-MCNC: 4.39 UIU/ML — HIGH (ref 0.27–4.2)
UROBILINOGEN FLD QL: NEGATIVE — SIGNIFICANT CHANGE UP
WBC # BLD: 4.33 K/UL — SIGNIFICANT CHANGE UP (ref 3.8–10.5)
WBC # FLD AUTO: 4.33 K/UL — SIGNIFICANT CHANGE UP (ref 3.8–10.5)
WBC UR QL: 0 /HPF — SIGNIFICANT CHANGE UP (ref 0–5)

## 2021-06-02 PROCEDURE — 93005 ELECTROCARDIOGRAM TRACING: CPT

## 2021-06-02 PROCEDURE — 80053 COMPREHEN METABOLIC PANEL: CPT

## 2021-06-02 PROCEDURE — 85025 COMPLETE CBC W/AUTO DIFF WBC: CPT

## 2021-06-02 PROCEDURE — 99284 EMERGENCY DEPT VISIT MOD MDM: CPT | Mod: 25

## 2021-06-02 PROCEDURE — 99285 EMERGENCY DEPT VISIT HI MDM: CPT

## 2021-06-02 PROCEDURE — 84480 ASSAY TRIIODOTHYRONINE (T3): CPT

## 2021-06-02 PROCEDURE — 82435 ASSAY OF BLOOD CHLORIDE: CPT

## 2021-06-02 PROCEDURE — 85018 HEMOGLOBIN: CPT

## 2021-06-02 PROCEDURE — 84295 ASSAY OF SERUM SODIUM: CPT

## 2021-06-02 PROCEDURE — 96374 THER/PROPH/DIAG INJ IV PUSH: CPT

## 2021-06-02 PROCEDURE — 71046 X-RAY EXAM CHEST 2 VIEWS: CPT | Mod: 26

## 2021-06-02 PROCEDURE — 81001 URINALYSIS AUTO W/SCOPE: CPT

## 2021-06-02 PROCEDURE — 85014 HEMATOCRIT: CPT

## 2021-06-02 PROCEDURE — 83735 ASSAY OF MAGNESIUM: CPT

## 2021-06-02 PROCEDURE — 84132 ASSAY OF SERUM POTASSIUM: CPT

## 2021-06-02 PROCEDURE — 82330 ASSAY OF CALCIUM: CPT

## 2021-06-02 PROCEDURE — 82947 ASSAY GLUCOSE BLOOD QUANT: CPT

## 2021-06-02 PROCEDURE — 83605 ASSAY OF LACTIC ACID: CPT

## 2021-06-02 PROCEDURE — 84436 ASSAY OF TOTAL THYROXINE: CPT

## 2021-06-02 PROCEDURE — 87086 URINE CULTURE/COLONY COUNT: CPT

## 2021-06-02 PROCEDURE — 84484 ASSAY OF TROPONIN QUANT: CPT

## 2021-06-02 PROCEDURE — 82803 BLOOD GASES ANY COMBINATION: CPT

## 2021-06-02 PROCEDURE — 84443 ASSAY THYROID STIM HORMONE: CPT

## 2021-06-02 PROCEDURE — 71046 X-RAY EXAM CHEST 2 VIEWS: CPT

## 2021-06-02 RX ORDER — SODIUM CHLORIDE 9 MG/ML
1000 INJECTION INTRAMUSCULAR; INTRAVENOUS; SUBCUTANEOUS ONCE
Refills: 0 | Status: COMPLETED | OUTPATIENT
Start: 2021-06-02 | End: 2021-06-02

## 2021-06-02 RX ORDER — METOCLOPRAMIDE HCL 10 MG
10 TABLET ORAL ONCE
Refills: 0 | Status: COMPLETED | OUTPATIENT
Start: 2021-06-02 | End: 2021-06-02

## 2021-06-02 RX ADMIN — SODIUM CHLORIDE 1000 MILLILITER(S): 9 INJECTION INTRAMUSCULAR; INTRAVENOUS; SUBCUTANEOUS at 03:31

## 2021-06-02 RX ADMIN — Medication 10 MILLIGRAM(S): at 03:31

## 2021-06-02 NOTE — ED PROVIDER NOTE - NSFOLLOWUPINSTRUCTIONS_ED_ALL_ED_FT
Dizziness can manifest as a feeling of unsteadiness or light-headedness. You may feel like you are about to faint. This condition can be caused by a number of things, including medicines, dehydration, or illness. Drink enough fluid to keep your urine clear or pale yellow. Do not drink alcohol and limit your caffeine intake. Avoid quick or sudden movements.  Rise slowly from chairs and steady yourself until you feel okay. In the morning, first sit up on the side of the bed.    SEEK IMMEDIATE MEDICAL CARE IF YOU HAVE ANY OF THE FOLLOWING SYMPTOMS: vomiting, changes in your vision or speech, weakness in your arms or legs, trouble speaking or swallowing, chest pain, abdominal pain, shortness of breath, sweating, bleeding, headache, neck pain, or fever.     You were also seen today in the emergency room for chest pain. Although the testing done today indicates that your pain is not from an acute emergency, your pain could still represent a problem with your heart. You need to follow up with your doctor and/or a cardiologist in the next 48-72 hours. If you develop any new or worsening symptoms you need to return immediately to the emergency department. If you experience any of the following please come right back to the emergency room: chest pain that becomes much worse with walking up stairs or exercising, uncontrollable nausea and vomiting, severe chest pain that will not go away, passing out, new persistent numbness and/or weakness. If we discussed that this pain was likely due to a muscle strain or sprain you should take over the counter medications such as Tylenol. You should avoid taking medications such as ibuprofen, Motrin, Advil or other NSAIDs until you speak with your doctor about your pain.

## 2021-06-02 NOTE — ED PROVIDER NOTE - NS ED ROS FT
Gen: Denies fever, weight loss  CV: Denies palpitations  Skin: Denies rash, erythema, color changes  Resp: Denies cough  Endo: Denies sensitivity to heat, cold, increased urination  GI: Denies diarrhea, constipation, vomiting  Msk: Denies back pain, LE swelling, extremity pain  : Denies increased frequency  Neuro: Denies LOC, numbness/tingling

## 2021-06-02 NOTE — ED ADULT NURSE NOTE - OBJECTIVE STATEMENT
50yo female presents co dizziness x5 days. Denies syncope. Pt also reports intermittent cp and fatigue over 5 days. Denies sob. No sick contacts. Pt A&Ox3. [place]. Respirations even/unlabored. Abd soft. No n/v/d. Denies urinary symptoms. Skin WDI.

## 2021-06-02 NOTE — ED PROVIDER NOTE - PROGRESS NOTE DETAILS
Arnold, PGY-1  Patient reports decreased dizziness. Patient able to ambulate without assistance with no difficulty with balance or increased dizziness. Patient requesting to go home. Will d/c with return precautions

## 2021-06-02 NOTE — ED PROVIDER NOTE - PHYSICAL EXAMINATION
Gen: WDWN, NAD, drowsy, appears unwell   HEENT: PERRLA, EOMI, no nasal discharge, mucous membranes dry   CV: RRR, +S1/S2, no M/R/G  Resp: CTAB, no W/R/R  GI: Abdomen soft non-distended, NTTP, no masses  MSK: No open wounds, no bruising, no LE edema  Neuro: CN2-12 grossly intact, A&Ox4, MS +5/5 in UE and LE BL, finger to nose smooth and rapid, gross sensation intact in UE and LE BL, gait smooth and coordinated  Psych: appropriate mood

## 2021-06-02 NOTE — ED PROVIDER NOTE - CLINICAL SUMMARY MEDICAL DECISION MAKING FREE TEXT BOX
52 y/o female with pmhx of chronic GI issues (GERD, gastritis, esophagitis) presenting with dizziness since Wednesday along with chest heaviness, appears unwell, afebrile/vitals wnl, no FND. Will check EKG, trop, CXR for low suspicion of cardiac etiology. Symptoms more likely attributable to dehydration vs. low suspicion of infectious process; will check electrolytes, urine, thyroid function. 1lNS bolus, reglan, and reassess

## 2021-06-02 NOTE — ED PROVIDER NOTE - ATTENDING CONTRIBUTION TO CARE
MD Draper:  patient seen and evaluated personally.   I agree with the History & Physical,  Impression & Plan other than what was detailed in my note.  MD Draper  52 y/o f presnting w/ cc of "fogginess" inside of her head, going on for past five days,  no associated ha, n/v, vertigo, unilateral weakness, also  having left sided chest heaviness, associated mild sob, radiation to left arm, has been going on intermittently x months. Pt states the main reason she is here is for the fogginess. no neck pain, fevers or chills, Also having years of intemittent acid reflux, diarrhea and nausea. afebrile vitals stable, non toxic,  non toxic well appearing, NC/AT,  conjunctiva non conjected, sclera anicteric, moist mucous membranes, neck supple, heart sounds, normal, no mrg, lungs cta b/l no wrr, abd soft non distended w/ no tenderness, no visual deformities of extremities, axox3, power 5/5x 4, cn 2-xii gi, finger to nose normal. does not sound consistent w/ emergent pathology, will check labs for metabolic derangements, not consistent w/ bacterial menignitis, chest pain unlikely acs given symptoms are chronic, ekg shows nsr 76 bpm, nad normal intervals, no sig st changes however will get acs workup.

## 2021-06-02 NOTE — ED PROVIDER NOTE - OBJECTIVE STATEMENT
50 y/o female with pmhx of chronic GI issues (GERD, gastritis, esophagitis) presenting with dizziness since Wednesday along with multiple medical complaints. Dizziness is described a "fogginess" with no spinning sensation or LOC. Patient denies headache but reports occasional intermittent "tightness" on left side of head. Patient also reports that she has chronic chest heaviness that radiates to left arm but feels that these symptoms have been more severe recently, worse with exertion and with associated SOB. Reports intermittent burning with urination and intermittent nausea but denies fevers/chills, n/v, cough, rashes. No recent travel or sick contacts. Describes that dizziness is the main reason she in ER and she feels fatigued and generally unwell. Drinking and eating less than usual.

## 2021-06-03 LAB
CULTURE RESULTS: SIGNIFICANT CHANGE UP
SPECIMEN SOURCE: SIGNIFICANT CHANGE UP

## 2021-06-03 NOTE — ED POST DISCHARGE NOTE - DETAILS
Elie Chiang PA-C: 6/3/21 s/w pt regarding elev TSH, nrml T3/T4, no hx of thyroid dz, understands to f/u w/ PMD.

## 2021-08-24 NOTE — DISCHARGE NOTE ADULT - MEDICATION SUMMARY - MEDICATIONS TO CHANGE
Pt reports chest pressure that started this morning between 2401-7577. Pt states that she was able to take a nap and eat lunch, but became worse this afternoon. Pt states the pressure radiates to bilateral shoulders and right upper arm. Pt also endorses light-headedness and nausea. Pt has taken antacids and tylenol.    I will SWITCH the dose or number of times a day I take the medications listed below when I get home from the hospital:  None

## 2021-10-26 ENCOUNTER — RESULT REVIEW (OUTPATIENT)
Age: 51
End: 2021-10-26

## 2022-01-01 NOTE — DISCHARGE NOTE ADULT - NSFTFHOMEOTHERFT_GEN_ALL_CORE
Xray Forearm, Right S/P closed reduction and percutaneous pinning of left valgus-impacted femoral neck fracture

## 2022-05-03 ENCOUNTER — RESULT REVIEW (OUTPATIENT)
Age: 52
End: 2022-05-03

## 2022-08-23 ENCOUNTER — APPOINTMENT (OUTPATIENT)
Dept: GASTROENTEROLOGY | Facility: CLINIC | Age: 52
End: 2022-08-23

## 2022-08-23 ENCOUNTER — NON-APPOINTMENT (OUTPATIENT)
Age: 52
End: 2022-08-23

## 2022-08-23 VITALS
SYSTOLIC BLOOD PRESSURE: 136 MMHG | HEART RATE: 68 BPM | HEIGHT: 61 IN | BODY MASS INDEX: 21.9 KG/M2 | DIASTOLIC BLOOD PRESSURE: 81 MMHG | WEIGHT: 116 LBS

## 2022-08-23 DIAGNOSIS — E78.00 PURE HYPERCHOLESTEROLEMIA, UNSPECIFIED: ICD-10-CM

## 2022-08-23 DIAGNOSIS — F41.9 ANXIETY DISORDER, UNSPECIFIED: ICD-10-CM

## 2022-08-23 DIAGNOSIS — R74.8 ABNORMAL LEVELS OF OTHER SERUM ENZYMES: ICD-10-CM

## 2022-08-23 DIAGNOSIS — B96.81 GASTRITIS, UNSPECIFIED, W/OUT BLEEDING: ICD-10-CM

## 2022-08-23 DIAGNOSIS — K21.9 GASTRO-ESOPHAGEAL REFLUX DISEASE W/OUT ESOPHAGITIS: ICD-10-CM

## 2022-08-23 DIAGNOSIS — Z12.11 ENCOUNTER FOR SCREENING FOR MALIGNANT NEOPLASM OF COLON: ICD-10-CM

## 2022-08-23 DIAGNOSIS — K29.70 GASTRITIS, UNSPECIFIED, W/OUT BLEEDING: ICD-10-CM

## 2022-08-23 DIAGNOSIS — R10.30 LOWER ABDOMINAL PAIN, UNSPECIFIED: ICD-10-CM

## 2022-08-23 DIAGNOSIS — R14.0 ABDOMINAL DISTENSION (GASEOUS): ICD-10-CM

## 2022-08-23 PROCEDURE — 99214 OFFICE O/P EST MOD 30 MIN: CPT

## 2022-08-23 PROCEDURE — 82274 ASSAY TEST FOR BLOOD FECAL: CPT | Mod: QW

## 2022-08-23 RX ORDER — AMOXICILLIN 500 MG/1
500 TABLET, FILM COATED ORAL TWICE DAILY
Qty: 56 | Refills: 0 | Status: DISCONTINUED | COMMUNITY
Start: 2020-09-30 | End: 2022-08-23

## 2022-08-23 RX ORDER — OMEPRAZOLE 20 MG/1
20 CAPSULE, DELAYED RELEASE ORAL TWICE DAILY
Qty: 60 | Refills: 3 | Status: DISCONTINUED | COMMUNITY
Start: 2020-09-30 | End: 2022-08-23

## 2022-08-23 RX ORDER — CALCIUM CARB/MAGNESIUM CARB 311-232MG
TABLET ORAL
Refills: 0 | Status: DISCONTINUED | COMMUNITY
End: 2022-08-23

## 2022-08-23 RX ORDER — OMEPRAZOLE 20 MG/1
20 CAPSULE, DELAYED RELEASE ORAL
Qty: 180 | Refills: 3 | Status: ACTIVE | COMMUNITY
Start: 2022-08-23 | End: 1900-01-01

## 2022-08-23 RX ORDER — FAMOTIDINE 20 MG/1
20 TABLET, FILM COATED ORAL
Qty: 180 | Refills: 3 | Status: ACTIVE | COMMUNITY
Start: 2022-08-23 | End: 1900-01-01

## 2022-08-23 RX ORDER — SUCRALFATE 1 G/10ML
1 SUSPENSION ORAL
Qty: 1 | Refills: 5 | Status: DISCONTINUED | COMMUNITY
Start: 2021-01-25 | End: 2022-08-23

## 2022-08-23 RX ORDER — LEVOFLOXACIN 500 MG/1
500 TABLET, FILM COATED ORAL
Qty: 14 | Refills: 0 | Status: DISCONTINUED | COMMUNITY
Start: 2020-11-11 | End: 2022-08-23

## 2022-08-23 NOTE — REVIEW OF SYSTEMS
[Feeling Tired] : feeling tired [Dry Eyes] : dryness of the eyes [Palpitations] : palpitations [Leg Claudication] : intermittent leg claudication [Abdominal Pain] : abdominal pain [Heartburn] : heartburn [Pelvic Pain] : pelvic pain [Joint Swelling] : joint swelling [Negative] : Heme/Lymph

## 2022-09-06 ENCOUNTER — NON-APPOINTMENT (OUTPATIENT)
Age: 52
End: 2022-09-06

## 2022-09-06 RX ORDER — POLYETHYLENE GLYCOL 3350 17 G/17G
17 POWDER, FOR SOLUTION ORAL
Qty: 1 | Refills: 0 | Status: ACTIVE | COMMUNITY
Start: 2022-09-06 | End: 1900-01-01

## 2022-09-14 DIAGNOSIS — Z01.818 ENCOUNTER FOR OTHER PREPROCEDURAL EXAMINATION: ICD-10-CM

## 2022-09-14 LAB — SARS-COV-2 N GENE NPH QL NAA+PROBE: NOT DETECTED

## 2022-09-16 NOTE — PHYSICAL EXAM
[General Appearance - Alert] : alert [General Appearance - In No Acute Distress] : in no acute distress [General Appearance - Well Nourished] : well nourished [General Appearance - Well Developed] : well developed [General Appearance - Well-Appearing] : healthy appearing [Sclera] : the sclera and conjunctiva were normal [PERRL With Normal Accommodation] : pupils were equal in size, round, and reactive to light [Extraocular Movements] : extraocular movements were intact [Strabismus] : no strabismus was seen [Outer Ear] : the ears and nose were normal in appearance [Examination Of The Oral Cavity] : the lips and gums were normal [Both Tympanic Membranes Were Examined] : both tympanic membranes were normal [Oropharynx] : the oropharynx was normal [Neck Appearance] : the appearance of the neck was normal [Neck Cervical Mass (___cm)] : no neck mass was observed [Jugular Venous Distention Increased] : there was no jugular-venous distention [Thyroid Diffuse Enlargement] : the thyroid was not enlarged [Thyroid Nodule] : there were no palpable thyroid nodules [Auscultation Breath Sounds / Voice Sounds] : lungs were clear to auscultation bilaterally [Lungs Percussion] : the lungs were normal to percussion [Heart Rate And Rhythm] : heart rate was normal and rhythm regular [Heart Sounds] : normal S1 and S2 [Heart Sounds Gallop] : no gallops [Murmurs] : no murmurs [Heart Sounds Pericardial Friction Rub] : no pericardial rub [Arterial Pulses Carotid] : carotid pulses were normal with no bruits [Abdominal Aorta] : the abdominal aorta was normal [Full Pulse] : the pedal pulses are present [Edema] : there was no peripheral edema [Veins - Varicosity Changes] : there were no varicosital changes [Bowel Sounds] : normal bowel sounds [Abdomen Soft] : soft [Abdomen Tenderness] : non-tender [Abdomen Mass (___ Cm)] : no abdominal mass palpated [Abdomen Hernia] : no hernia was discovered [Normal Sphincter Tone] : normal sphincter tone [No Rectal Mass] : no rectal mass [Cervical Lymph Nodes Enlarged Posterior Bilaterally] : posterior cervical [Cervical Lymph Nodes Enlarged Anterior Bilaterally] : anterior cervical [Supraclavicular Lymph Nodes Enlarged Bilaterally] : supraclavicular [Femoral Lymph Nodes Enlarged Bilaterally] : femoral [Inguinal Lymph Nodes Enlarged Bilaterally] : inguinal [No CVA Tenderness] : no ~M costovertebral angle tenderness [No Spinal Tenderness] : no spinal tenderness [Abnormal Walk] : normal gait [Nail Clubbing] : no clubbing  or cyanosis of the fingernails [Involuntary Movements] : no involuntary movements were seen [Musculoskeletal - Swelling] : no joint swelling seen [Skin Color & Pigmentation] : normal skin color and pigmentation [Skin Turgor] : normal skin turgor [] : no rash [Skin Lesions] : no skin lesions [No Focal Deficits] : no focal deficits [Oriented To Time, Place, And Person] : oriented to person, place, and time [Impaired Insight] : insight and judgment were intact [Affect] : the affect was normal [Mood] : the mood was normal [Occult Blood Positive] : stool was negative for occult blood [FreeTextEntry1] : Stool brown, negative Hemoccult, negative iFOBT

## 2022-09-16 NOTE — ASSESSMENT
[FreeTextEntry1] : Assessment:\par 1.  GERD/dyspepsia-upper endoscopy September 8, 2020 revealed features of gastroesophageal reflux and H. pylori gastritis-treated and eradicated.\par 2.  Lower abdominal pain-intermittent and associated with bloating-suspect related to irritable bowel syndrome.\par 3.  Mild elevation in liver enzymes-May be secondary to statin (which was stopped); rule out secondary to fatty liver.\par 4.  Anxiety.\par \par Plan:\par 1.  The patient was advised to repeat the liver enzymes in 3 months, and if still elevated, a repeat abdominal sonogram should be performed.  She will discuss with her PCP the need for statin for hypercholesterolemia.\par 2.  The patient will resume omeprazole 20 mg twice daily and will continue to take famotidine 20 mg twice daily, but these may be on a as needed basis\par 3.  Patient was advised to use IBgard as needed abdominal bloating and gas.\par 4.  The patient was advised to schedule a colonoscopy.  The procedure, material risks (including bleeding, perforation, anesthesia-related complications, rare complications, death and risk of missing a lesion), benefits (including finding a polyp, cancer, inflammatory bowel disease or other lesions) and alternatives (including stool testing and imaging) were discussed with the patient at length.  The [employer] Brochure was given. The MiraLax preparation was advised.

## 2022-09-16 NOTE — CONSULT LETTER
[Dear  ___] : Dear  [unfilled], [Consult Letter:] : I had the pleasure of evaluating your patient, [unfilled]. [Please see my note below.] : Please see my note below. [Consult Closing:] : Thank you very much for allowing me to participate in the care of this patient.  If you have any questions, please do not hesitate to contact me. [Sincerely,] : Sincerely, [( Thank you for referring [unfilled] for consultation for _____ )] : Thank you for referring [unfilled] for consultation for [unfilled] [FreeTextEntry3] : Ezequiel Patel MD

## 2022-09-16 NOTE — HISTORY OF PRESENT ILLNESS
FreeTextEntry1] : Bud has multiple GI complaints.  Since she was last seen, she discontinued her Prilosec, but now has more bloating and burping.  She also is undergoing root canal surgery, and had been taking a few doses of Motrin.  She thinks that this may be contributing to some of her symptoms, but she has been under considerable stress since her mother  last year secondary to COVID.  She has lower abdominal pain at times.  She was on Crestor, but this was discontinued a few months ago when her liver enzymes were found to be slightly elevated.  She admits to having a lot of stress, back pain, hip pain and osteopenia.  In 2017 she had a left hip fracture and 4 screws were inserted, and the orthopedist is not sure whether or not the screws are creating symptoms for her.  She never had a colonoscopy that was previously advised as a screening procedure.

## 2022-09-19 ENCOUNTER — APPOINTMENT (OUTPATIENT)
Dept: GASTROENTEROLOGY | Facility: CLINIC | Age: 52
End: 2022-09-19

## 2022-09-19 PROCEDURE — 45378 DIAGNOSTIC COLONOSCOPY: CPT

## 2022-09-19 PROCEDURE — 84703 CHORIONIC GONADOTROPIN ASSAY: CPT | Mod: 59,QW

## 2022-11-15 ENCOUNTER — RESULT REVIEW (OUTPATIENT)
Age: 52
End: 2022-11-15

## 2023-06-30 NOTE — BRIEF OPERATIVE NOTE - PROCEDURE
<<-----Click on this checkbox to enter Procedure Surgical treatment of fracture of hip  12/17/2017  closed reduction and percutaneous pinning of left valgus-impacted femoral neck fracture  Active  TMULRY No.

## 2024-03-07 NOTE — DISCHARGE NOTE ADULT - NSFTFADEVICE3FT_GEN_ALL_CORE
HD ID card placed in patient's chart S/P closed reduction and percutaneous pinning of left valgus-impacted femoral neck fracture

## 2024-12-04 ENCOUNTER — NON-APPOINTMENT (OUTPATIENT)
Age: 54
End: 2024-12-04

## 2024-12-18 ENCOUNTER — APPOINTMENT (OUTPATIENT)
Dept: NEUROLOGY | Facility: CLINIC | Age: 54
End: 2024-12-18
Payer: MEDICAID

## 2024-12-18 VITALS
WEIGHT: 121 LBS | TEMPERATURE: 98 F | BODY MASS INDEX: 22.84 KG/M2 | SYSTOLIC BLOOD PRESSURE: 134 MMHG | OXYGEN SATURATION: 100 % | DIASTOLIC BLOOD PRESSURE: 82 MMHG | HEART RATE: 74 BPM | HEIGHT: 61 IN

## 2024-12-18 DIAGNOSIS — R51.9 HEADACHE, UNSPECIFIED: ICD-10-CM

## 2024-12-18 DIAGNOSIS — Z87.898 PERSONAL HISTORY OF OTHER SPECIFIED CONDITIONS: ICD-10-CM

## 2024-12-18 DIAGNOSIS — R11.0 NAUSEA: ICD-10-CM

## 2024-12-18 DIAGNOSIS — Z87.81 PERSONAL HISTORY OF (HEALED) TRAUMATIC FRACTURE: ICD-10-CM

## 2024-12-18 DIAGNOSIS — R41.89 OTHER SYMPTOMS AND SIGNS INVOLVING COGNITIVE FUNCTIONS AND AWARENESS: ICD-10-CM

## 2024-12-18 DIAGNOSIS — R00.2 PALPITATIONS: ICD-10-CM

## 2024-12-18 DIAGNOSIS — R42 DIZZINESS AND GIDDINESS: ICD-10-CM

## 2024-12-18 DIAGNOSIS — Z87.39 PERSONAL HISTORY OF OTHER DISEASES OF THE MUSCULOSKELETAL SYSTEM AND CONNECTIVE TISSUE: ICD-10-CM

## 2024-12-18 DIAGNOSIS — R79.89 OTHER SPECIFIED ABNORMAL FINDINGS OF BLOOD CHEMISTRY: ICD-10-CM

## 2024-12-18 PROCEDURE — G2212 PROLONG OUTPT/OFFICE VIS: CPT

## 2024-12-18 PROCEDURE — 99205 OFFICE O/P NEW HI 60 MIN: CPT

## 2024-12-18 RX ORDER — FAMOTIDINE 20 MG/1
20 TABLET, FILM COATED ORAL DAILY
Qty: 30 | Refills: 0 | Status: ACTIVE | COMMUNITY

## 2024-12-18 RX ORDER — GABAPENTIN 100 MG/1
100 CAPSULE ORAL
Refills: 0 | Status: ACTIVE | COMMUNITY

## 2024-12-18 RX ORDER — ALENDRONATE SODIUM 70 MG/1
70 TABLET ORAL
Refills: 0 | Status: ACTIVE | COMMUNITY

## 2024-12-18 RX ORDER — ROSUVASTATIN CALCIUM 5 MG/1
5 TABLET, FILM COATED ORAL
Qty: 30 | Refills: 0 | Status: ACTIVE | COMMUNITY

## 2024-12-18 RX ORDER — OMEPRAZOLE 20 MG/1
20 CAPSULE, DELAYED RELEASE ORAL DAILY
Refills: 0 | Status: ACTIVE | COMMUNITY

## 2024-12-18 RX ORDER — ESCITALOPRAM OXALATE 5 MG/1
5 TABLET ORAL
Qty: 30 | Refills: 0 | Status: ACTIVE | COMMUNITY

## 2025-02-13 NOTE — H&P ADULT - HISTORY OF PRESENT ILLNESS
47y Female community ambulator w/o assistive devices presents c/o L hip pain and inability to ambulate sp mechanical fall. Denies HS/LOC. Denies numbness/tingling. Denies fever/chills. Denies pain/injury elsewhere.     PAST MEDICAL & SURGICAL HISTORY:    MEDICATIONS  (STANDING):  sodium chloride 0.9%. 1000 milliLiter(s) IV Continuous <Continuous>    Allergies    penicillin (Unknown)    Intolerances
Inadequate Protein Energy Intake

## 2025-04-03 ENCOUNTER — ASOB RESULT (OUTPATIENT)
Age: 55
End: 2025-04-03

## 2025-04-03 ENCOUNTER — APPOINTMENT (OUTPATIENT)
Facility: CLINIC | Age: 55
End: 2025-04-03

## 2025-04-03 VITALS — SYSTOLIC BLOOD PRESSURE: 117 MMHG | DIASTOLIC BLOOD PRESSURE: 76 MMHG

## 2025-04-03 DIAGNOSIS — L29.2 PRURITUS VULVAE: ICD-10-CM

## 2025-04-03 LAB
GLUCOSE UR-MCNC: NORMAL
HCG UR QL: NEGATIVE
HCG UR QL: NORMAL EU/DL
HGB UR QL STRIP.AUTO: NORMAL
LEUKOCYTE ESTERASE UR QL STRIP: NORMAL
NITRITE UR QL STRIP: NORMAL
PROT UR STRIP-MCNC: NORMAL

## 2025-04-03 PROCEDURE — 81025 URINE PREGNANCY TEST: CPT

## 2025-04-03 PROCEDURE — 76830 TRANSVAGINAL US NON-OB: CPT

## 2025-04-03 PROCEDURE — 81003 URINALYSIS AUTO W/O SCOPE: CPT | Mod: QW

## 2025-04-03 PROCEDURE — 99459 PELVIC EXAMINATION: CPT | Mod: NC

## 2025-04-03 PROCEDURE — 99396 PREV VISIT EST AGE 40-64: CPT | Mod: 25

## 2025-04-03 PROCEDURE — 99214 OFFICE O/P EST MOD 30 MIN: CPT | Mod: 25

## 2025-04-03 RX ORDER — CLOTRIMAZOLE AND BETAMETHASONE DIPROPIONATE 10; .5 MG/G; MG/G
1-0.05 CREAM TOPICAL TWICE DAILY
Qty: 1 | Refills: 0 | Status: ACTIVE | COMMUNITY
Start: 2025-04-03 | End: 1900-01-01

## 2025-04-03 RX ORDER — TERCONAZOLE 4 MG/G
0.4 CREAM VAGINAL
Qty: 1 | Refills: 0 | Status: ACTIVE | COMMUNITY
Start: 2025-04-03 | End: 1900-01-01

## 2025-04-04 ENCOUNTER — NON-APPOINTMENT (OUTPATIENT)
Age: 55
End: 2025-04-04

## 2025-04-04 LAB
BV BACTERIA RRNA VAG QL NAA+PROBE: NOT DETECTED
C GLABRATA RNA VAG QL NAA+PROBE: NOT DETECTED
CANDIDA RRNA VAG QL PROBE: NOT DETECTED
HPV HIGH+LOW RISK DNA PNL CVX: NOT DETECTED
T VAGINALIS RRNA SPEC QL NAA+PROBE: NOT DETECTED

## 2025-04-09 LAB — CYTOLOGY CVX/VAG DOC THIN PREP: ABNORMAL

## 2025-06-24 ENCOUNTER — TRANSCRIPTION ENCOUNTER (OUTPATIENT)
Age: 55
End: 2025-06-24

## 2025-07-24 ENCOUNTER — NON-APPOINTMENT (OUTPATIENT)
Age: 55
End: 2025-07-24

## 2025-08-04 ENCOUNTER — RESULT REVIEW (OUTPATIENT)
Age: 55
End: 2025-08-04

## 2025-08-04 ENCOUNTER — APPOINTMENT (OUTPATIENT)
Dept: NEUROLOGY | Facility: CLINIC | Age: 55
End: 2025-08-04
Payer: MEDICAID

## 2025-08-04 VITALS
BODY MASS INDEX: 23.79 KG/M2 | HEART RATE: 59 BPM | WEIGHT: 126 LBS | DIASTOLIC BLOOD PRESSURE: 74 MMHG | SYSTOLIC BLOOD PRESSURE: 111 MMHG | HEIGHT: 61 IN

## 2025-08-04 DIAGNOSIS — G43.709 CHRONIC MIGRAINE W/OUT AURA, NOT INTRACTABLE, W/OUT STATUS MIGRAINOSUS: ICD-10-CM

## 2025-08-04 DIAGNOSIS — R42 DIZZINESS AND GIDDINESS: ICD-10-CM

## 2025-08-04 DIAGNOSIS — G43.809 OTHER MIGRAINE, NOT INTRACTABLE, W/OUT STATUS MIGRAINOSUS: ICD-10-CM

## 2025-08-04 DIAGNOSIS — F41.9 ANXIETY DISORDER, UNSPECIFIED: ICD-10-CM

## 2025-08-04 PROCEDURE — 99205 OFFICE O/P NEW HI 60 MIN: CPT

## 2025-08-04 RX ORDER — RIMEGEPANT SULFATE 75 MG/75MG
75 TABLET, ORALLY DISINTEGRATING ORAL DAILY
Qty: 15 | Refills: 1 | Status: ACTIVE | COMMUNITY
Start: 2025-08-04 | End: 1900-01-01

## 2025-08-04 RX ORDER — ATOGEPANT 60 MG/1
60 TABLET ORAL
Qty: 30 | Refills: 6 | Status: ACTIVE | COMMUNITY
Start: 2025-08-04 | End: 1900-01-01

## 2025-08-04 RX ORDER — ESCITALOPRAM OXALATE 5 MG/1
5 TABLET ORAL DAILY
Qty: 30 | Refills: 3 | Status: ACTIVE | COMMUNITY
Start: 2025-08-04 | End: 1900-01-01

## 2025-08-14 ENCOUNTER — APPOINTMENT (OUTPATIENT)
Dept: NEUROLOGY | Facility: CLINIC | Age: 55
End: 2025-08-14

## 2025-08-14 PROCEDURE — 93880 EXTRACRANIAL BILAT STUDY: CPT

## 2025-08-14 PROCEDURE — 93892 TCD EMBOLI DETECT W/O INJ: CPT

## 2025-08-14 PROCEDURE — 93886 INTRACRANIAL COMPLETE STUDY: CPT

## 2025-09-10 ENCOUNTER — APPOINTMENT (OUTPATIENT)
Dept: CT IMAGING | Facility: IMAGING CENTER | Age: 55
End: 2025-09-10

## 2025-09-10 ENCOUNTER — NON-APPOINTMENT (OUTPATIENT)
Age: 55
End: 2025-09-10

## 2025-09-11 ENCOUNTER — APPOINTMENT (OUTPATIENT)
Dept: GASTROENTEROLOGY | Facility: CLINIC | Age: 55
End: 2025-09-11

## 2025-09-11 VITALS
BODY MASS INDEX: 23.41 KG/M2 | DIASTOLIC BLOOD PRESSURE: 76 MMHG | WEIGHT: 124 LBS | HEIGHT: 61 IN | HEART RATE: 82 BPM | SYSTOLIC BLOOD PRESSURE: 128 MMHG

## 2025-09-11 DIAGNOSIS — R13.19 OTHER DYSPHAGIA: ICD-10-CM

## 2025-09-11 DIAGNOSIS — K21.9 GASTRO-ESOPHAGEAL REFLUX DISEASE W/OUT ESOPHAGITIS: ICD-10-CM

## 2025-09-11 DIAGNOSIS — K76.0 FATTY (CHANGE OF) LIVER, NOT ELSEWHERE CLASSIFIED: ICD-10-CM

## 2025-09-11 PROCEDURE — G2211 COMPLEX E/M VISIT ADD ON: CPT | Mod: NC

## 2025-09-11 PROCEDURE — 99215 OFFICE O/P EST HI 40 MIN: CPT

## 2025-09-11 RX ORDER — METOCLOPRAMIDE 5 MG/1
5 TABLET ORAL EVERY 8 HOURS
Qty: 30 | Refills: 0 | Status: ACTIVE | COMMUNITY
Start: 2025-09-11 | End: 1900-01-01

## 2025-09-12 ENCOUNTER — TRANSCRIPTION ENCOUNTER (OUTPATIENT)
Age: 55
End: 2025-09-12

## 2025-09-15 ENCOUNTER — APPOINTMENT (OUTPATIENT)
Dept: NEUROLOGY | Facility: CLINIC | Age: 55
End: 2025-09-15
Payer: MEDICAID

## 2025-09-15 VITALS
BODY MASS INDEX: 22.66 KG/M2 | RESPIRATION RATE: 20 BRPM | HEIGHT: 61 IN | OXYGEN SATURATION: 99 % | HEART RATE: 65 BPM | WEIGHT: 120 LBS | SYSTOLIC BLOOD PRESSURE: 109 MMHG | DIASTOLIC BLOOD PRESSURE: 65 MMHG

## 2025-09-15 DIAGNOSIS — G43.709 CHRONIC MIGRAINE W/OUT AURA, NOT INTRACTABLE, W/OUT STATUS MIGRAINOSUS: ICD-10-CM

## 2025-09-15 DIAGNOSIS — F43.21 ADJUSTMENT DISORDER WITH DEPRESSED MOOD: ICD-10-CM

## 2025-09-15 DIAGNOSIS — F41.9 ANXIETY DISORDER, UNSPECIFIED: ICD-10-CM

## 2025-09-15 PROCEDURE — 99245 OFF/OP CONSLTJ NEW/EST HI 55: CPT

## 2025-09-15 RX ORDER — DULOXETINE 30 MG/1
30 CAPSULE, DELAYED RELEASE ORAL
Qty: 1 | Refills: 4 | Status: ACTIVE | COMMUNITY
Start: 2025-09-15 | End: 1900-01-01